# Patient Record
Sex: FEMALE | Race: WHITE | NOT HISPANIC OR LATINO | URBAN - METROPOLITAN AREA
[De-identification: names, ages, dates, MRNs, and addresses within clinical notes are randomized per-mention and may not be internally consistent; named-entity substitution may affect disease eponyms.]

---

## 2024-04-10 ENCOUNTER — E-VISIT (OUTPATIENT)
Dept: FAMILY MEDICINE CLINIC | Facility: TELEHEALTH | Age: 64
End: 2024-04-10
Payer: COMMERCIAL

## 2024-04-10 RX ORDER — METHYLPREDNISOLONE 4 MG/1
TABLET ORAL
Qty: 21 TABLET | Refills: 0 | Status: SHIPPED | OUTPATIENT
Start: 2024-04-10

## 2024-04-10 NOTE — EXTERNAL PATIENT INSTRUCTIONS
Note   I ordered you a Medrol sveta. This is a steroid. When you take steroids do not take nsaids. This inlcudes; ibuprofen, aleve and aspirin. You may take tylenol if needed for pain or fever. Hydrate well.   Diagnosis   Acute bronchitis (chest cold)   My name is RENÉ Metcalf, and I'm a healthcare provider at [facility]. Based on your responses, I see that you have a chest cold, also known as acute bronchitis.   To prevent the spread of illness to others, stay home and away from other people as much as possible while you're sick. When you need to be around other people, consider wearing a face mask.   Here are the main things to know about your current symptoms:    Acute bronchitis gets better on its own within 1 to 3 weeks.    The medications I recommend here will help ease your symptoms.   Based on what you told me in your interview, I haven't prescribed any antibiotics. Antibiotics fight bacteria, not viruses. Your bronchitis is caused by a virus, so antibiotics won't help. They could even make you feel worse as they can cause or worsen nausea, diarrhea, and stomach pain. The following factors suggest that a virus is causing your symptoms:    You've had symptoms for less than 10 days. A bacterial infection is suspected when you've had symptoms longer than 10 days without improvement.    You don't have a sore throat. A bacterial infection like strep throat causes a severe sore throat.    You haven't had a fever. Bacterial infections can cause a high fever.   Medications   Your pharmacy   [id number] [address] [phone]     Prescription   Benzonatate (200mg): Take 1 capsule by mouth 3 times a day as needed for cough. Do not chew or cut these capsules.   Albuterol sulfate HFA (90mcg/puff): Inhale 2 puffs every 6 hours as needed for wheezing. If symptoms are severe, may use as often as every 4 hours.   Non-prescription   Acetaminophen (325mg): Take 2 tablets  by mouth every 4 hours as needed for up to 10 days for any fever, pain, or discomfort associated with your condition. Do not exceed 6 doses in a 24-hour period.   Mucus Relief ER oral tablet, extended release (600mg): Take 1 tablet by mouth every 12 hours as needed for cough and congestion.   About your diagnosis   The bronchial tubes carry air to the lungs. Acute bronchitis happens when these tubes become irritated and inflamed. Smoke and air pollution can cause acute bronchitis, but it's usually caused by a virus. The viruses that cause the common cold can also cause bronchitis.   Common symptoms of acute bronchitis include coughing up mucus or phlegm, wheezing, and chest tightness. Cold symptoms along with fatigue, body aches, difficulty sleeping, or decreased appetite, are also common.   What to expect   If you follow this treatment plan, you should feel better in 1 to 3 weeks.   When to seek care   Call us at [phone]   with any sudden or unexpected symptoms.    Fever that measures over 103F or continues for more than 3 days.    A worsening headache.    Your shortness of breath becomes severe.    You need to use your albuterol inhaler more often than every 2 to 3 hours.    Your sinus pain continues for 10 days or more, without improvement.    Swallowing becomes extremely difficult or impossible.    More than 5 episodes of diarrhea in a day.    More than 5 episodes of vomiting in a day.    Coughing up red or bloody mucus.    Severe stomach pain.    Symptoms that get better for a few days, and then suddenly get worse.    Severe chest pain   Other treatment    Rest! Your body needs rest to recover and fight infection.    Drink plenty of water to stay hydrated.    If your nose or sinuses become very stuffy, try using a Neti Pot to flush them out. Neti Pots are available at any drugstore without a prescription.    Gargle with salt water several times a day to help your throat feel better. Cough drops and  throat lozenges may provide extra relief. A teaspoon of honey stirred into warm water or weak tea can help soothe a sore throat and cough.    Avoid smoke and air pollution. Smoke can make infections worse.   Prevention    Avoid close contact with other people when you're sick.    Cover your mouth and nose when you cough or sneeze. Use a tissue or cough into your elbow. Make sure that used tissues go directly into the trash.    Avoid touching your eyes, nose, or mouth while you're sick.    Wash your hands often, especially after coughing, sneezing, or blowing your nose. If soap and water are not available, use an alcohol-based hand .    If you or someone in your home or workplace is sick, disinfect commonly used items. This includes door handles, tables, computers, remotes, and pens.    Coronavirus (COVID-19) information   Common symptoms of COVID-19 include fever, cough, shortness of breath, fatigue, muscle or body aches, headaches, new loss of sense of taste or smell, sore throat, stuffy or runny nose, nausea or vomiting, and diarrhea. Most people who get COVID-19 have mild symptoms and can rest at home until they get better. Elderly people and those with chronic medical problems may be at risk for more serious complications.   FAQs about the COVID-19 vaccine   Are the vaccines safe?   Yes. Hundreds of millions of people in the [place] have already safely received COVID-19 vaccines under the most intense safety monitoring in the history of the [place].   Do I need the vaccine if I've already had COVID?   Yes. Vaccination helps protect you even if you've already had COVID.   If you had COVID-19 and had symptoms, wait to get vaccinated until you've recovered and completed your isolation period.   If you tested positive for COVID-19 but did not have symptoms, you can get vaccinated after 5 full days have passed since you had a positive test, as long as you don't develop symptoms.   How many doses of the vaccine do I  need?   Visit www.cdc.gov/coronavirus/2019-ncov/vaccines/stay-up-to-date.html   to find out how to stay up to date with your COVID-19 vaccines.   I'm immunocompromised. How many doses of the vaccine do I need?   For information on how immunocompromised people can stay up to date with their COVID-19 vaccines, visit www.cdc.gov/coronavirus/2019-ncov/vaccines/recommendations/immuno.html  .   What are the common side effects of the vaccine?   A sore arm, tiredness, headache, and muscle pain may occur within two days of getting the vaccine and last a day or two. For the Moderna or Pfizer vaccines, side effects are more common after the second dose. People over the age of 55 are less likely to have side effects than younger people.   After I'm up to date on vaccines, can I still get or spread COVID?   Yes, you can still get COVID, but your disease should be milder. And your risk of serious illness, hospitalization, and complications will be much lower, especially if you're up to date. Unfortunately, you can still spread COVID if you've been vaccinated. That's why it's important to follow isolation guidelines if you get sick or test positive.   After I'm up to date on vaccines, can I go back to normal?   You should still wear a mask indoors in public if:    It's required by laws, rules, regulations, or local guidance.    You have a weakened immune system.    Your age puts you at increased risk of severe disease.    You have a medical condition that puts you at increased risk of severe disease.    Someone in your household has a weakened immune system, is at increased risk for severe disease, or is unvaccinated.    You're in an area of high transmission.   Where can I get a COVID-19 vaccine?   Visit [facility]'s website for more information. To find a COVID-19 vaccination site near you, visit www.vaccines.gov/  , call [phone]  , or text your zip code to 867327 ([place]). Message and data rates may apply.   I've  had close contact with someone who has COVID. Do I need to quarantine, and if so, for how long?   For the most current answer, including a calculator to determine whether you need to stay home and for how long, visit www.cdc.gov/coronavirus/2019-ncov/your-health/isolation.html  .   I've tested positive for COVID. How long do I need to isolate?   For the latest recommendations, including a calculator to determine how long you need to stay home, visit www.cdc.gov/coronavirus/2019-ncov/your-health/isolation.html  .   What if I develop symptoms that might be from COVID?   For the latest recommendations on what to do if you're sick, including when to seek emergency care, visit www.cdc.gov/coronavirus/2019-ncov/if-you-are-sick/index.html  .    Flu vaccine information   Who should get a flu vaccine?   Everyone 6 months of age and older should get a yearly flu vaccine.   When should I get vaccinated?   You should get a flu vaccine by the end of October. Once you're vaccinated, it takes about two weeks for antibodies to develop and protect you against the flu. That's why it's important to get vaccinated as soon as possible.   After October, is it too late to get vaccinated?   No. You should still get vaccinated. As long as the flu viruses are still in your community, flu vaccines will remain available, even into January of next year or later.   Why do I need a flu vaccine EVERY year?   Flu viruses are constantly changing, so flu vaccines are usually updated from one season to the next. Your protection from the flu vaccine also lessens over time.   Is the flu vaccine safe?   Yes. Over the last 50 years, hundreds of millions of Americans have safely received the flu vaccines.   What are the side effects of flu vaccines?   You CANNOT get the flu from a flu vaccine. Common side effects of the flu shot include soreness, redness and/or swelling where the shot was given, low grade fever, and aches. Common side effects of the nasal  spray flu vaccine for adults include runny nose, headaches, sore throat, and cough. For children, side effects include wheezing, vomiting, muscle aches, and fever.   Does the flu vaccine increase your risk of getting COVID-19?   No. There is no evidence that getting a flu vaccine increases your risk of getting COVID-19.   Is it safe to get the flu vaccine along with a COVID-19 vaccine?   Yes. It's safe to get the flu vaccine with a COVID-19 vaccine or booster.   Contact your healthcare provider TODAY for details on when and where to get your flu vaccine.   Your provider   Your diagnosis was provided by RENÉ Metcalf, a member of your trusted care team at [facility].   If you have any questions, call us at [phone]  .

## 2024-04-10 NOTE — E-VISIT TREATED
Chief Complaint: Cold, flu, COVID, sinus, hay fever, or seasonal allergies   Patient introduction   Patient is 63-year-old female with cough, shortness of breath, and new loss of smell or taste that started 3 to 5 days ago. Regarding date of symptom onset, patient writes: 04/06/24.   COVID-19 testing history, vaccination status, and exposure:    Patient was tested for COVID-19 within the last 24 hours. Test result was negative.    Has not received an updated [id number] COVID-19 vaccination (Pfizer-BioNTech or Moderna vaccine after September 12, 2023; or Novavax vaccine after October 3, 2023).    No known exposure to a person with a confirmed or suspected case of COVID-19.    No high-risk (household) exposure to COVID-19 within the last 14 days.   Risk factors for severe disease from COVID-19 infection    Age 50 or older.    BMI >= 40.   General presentation   Symptoms came on gradually.   Fever:    No fever.   Sinus and nasal symptoms:    Sinus pain or pressure on or around the cheeks and upper teeth or jaw.    Patient first noticed sinus pain less than 5 days ago.    Sinus pain is not worse with Valsalva.    No nasal or sinus congestion.    No nasal discharge.    No itchy nose or sneezing.    No history of unhealed nasal septal ulcer/nasal wound.    No history of antibiotic treatment for sinus infection in the last year.    No history of deviated septum or nasal polyps.   Throat symptoms:    No sore throat.   Head and body aches:    No headache.    No sweats.    No chills.    No myalgia.    No fatigue.   Cough:    Cough is worse in the morning.    Cough is productive of sputum.    Describes color of sputum as yellow.   Wheezing and shortness of breath:    Wheezing.    Moderate shortness of breath that does not affect ADLs.    Despite shortness of breath, patient can speak normally.    Despite shortness of breath, patient can take a full, deep breath (inhaling for 3 to 4 seconds).    Has previously used an  "albuterol inhaler for URI, bronchitis, or pneumonia.    Using albuterol for current symptoms.    Using albuterol every 4 to 6 hours.    Patient requests a prescription of albuterol in the form of an inhaler.    No COPD diagnosis.    No asthma diagnosis.    No previous steroid inhaler use for URIs, bronchitis, or pneumonia.   Chest pain:    No chest pain.   Ear symptoms:    None.   Dizziness:    No dizziness.   Allergies:    Patient has known seasonal allergies.    Patient does not think symptoms are allergy-related.   Flu exposure:    No recent known exposure to a person with a confirmed flu diagnosis.    Received a flu vaccine in the last 3 to 6 months.   Patient is taking over-the-counter medications for current symptoms, including acetaminophen, cetirizine, guaifenesin, and ibuprofen.   Review of red flags/alarm symptoms:    No severe shortness of breath.    No inability to repeat a sentence without stopping for breath.    No inability to take a full breath lasting 3 to 4 seconds.    No changes in alertness or awareness.    No paroxysmal cough followed by whoop on inspiration    No symptoms suggesting respiratory distress.    No decreased urination.    No blue or gray coloring present in face, lips, or nail beds.    No swelling, pain, redness, or increased warmth in the calf or lower part of ONE leg only.    No proptosis.   Pregnancy/menstrual status/breastfeeding:    Patient is postmenopausal.   Self-exam:    Height: 5' 5\"    Weight: 257 lbs    Patient has a home pulse oximeter, a home blood pressure monitor, and a FitBit or smart wristband.    Regarding exact blood pressure reading and heart rate, patient writes: 126/72.    Regarding O2 saturation percentage and heart rate BPM readings on their home pulse oximeter, patient writes: 95% 68 bpm.    Neck lymph nodes feel normal.   Recent antibiotic use:    Has not taken antibiotics for similar symptoms within the past month.   Current medications   Currently taking " cetirizine 10 MG tablet.   Medication allergies    Cephalosporins    Paxlovid or one of its components    Penicillins   Medication contraindication review   No history of anaphylactic reaction to beta-lactam antibiotics; aspirin triad; blood dyscrasia; bone marrow depression; catecholamine-releasing paraganglioma; coronary artery disease; coagulation disorder; congenital long QT syndrome; depression; electrolyte abnormalities; fungal infection; GI bleeding; GI obstruction; G6PD deficiency; heart arrhythmia; hypertension; mononucleosis; myasthenia; recent myocardial infarction; NSAID-induced asthma/urticaria; Parkinson's disease; pheochromocytoma; porphyria; Reye syndrome; seizure disorder; ulcerative colitis; and urinary retention.   No history of metoclopramide-associated dystonic reaction and tardive dyskinesia.   No known history of azithromycin-associated cholestatic jaundice or hepatic impairment.   Past medical history   Immune conditions:   No immunocompromising conditions.   No history of cancer.   Social history   Never smoked tobacco.   High-risk household contacts:    Household contact 65 years or older.    Household contact with extreme obesity (BMI > 40).   Patient-submitted comments   Patient was asked if they had anything to add about their symptoms. Patient writes: Not getting any sicker, but not improving any either..   Patient did not request an excuse note.   Assessment   Acute bronchitis.   This is the likely diagnosis based on patient's interview responses, including:    Symptom profile    Gradual onset of symptoms   Plan   Medications:    benzonatate 200 mg capsule RX 200mg 1 cap PO tid PRN 7d for cough. Do not chew or cut these capsules. Amount is 21 cap.    albuterol sulfate HFA 90 mcg/actuation aerosol inhaler RX 90mcg/puff 2 puffs inhaled q6h PRN 10d for wheezing. If symptoms are severe, may use as often as every 4 hours. Amount is 1 each.    acetaminophen 325 mg tablet OTC 325mg 2 tabs PO  q4h PRN 10d for any fever, pain, or discomfort associated with your condition. Do not exceed 6 doses in a 24-hour period. Amount is 120 tab.    Mucus Relief  mg tablet, extended release OTC 600mg 1 tab PO q12h PRN 7d for cough and congestion. Amount is 14 tab.   The patient's prescriptions will be sent to:   [id number] [address]   Phone: [phone]     Fax: [phone]   Patient informed to purchase OTC medications.   Education:    Condition and causes    Prevention    Treatment and self-care    When to call provider   ----------   Electronically signed by RENÉ Metcalf on 2024-04-10 at 16:02PM   ----------   Patient Interview Transcript:   Which of these symptoms are bothering you? Select all that apply.    Cough    Shortness of breath    Loss of smell or taste   Not selected:    Stuffed-up nose or sinuses    Runny nose    Itchy or watery eyes    Itchy nose or sneezing    Sore throat    Hoarse voice or loss of voice    Headache    Fever    Sweats    Chills    Muscle or body aches    Fatigue or tiredness    Nausea or vomiting    Diarrhea    I don't have any of these symptoms   When did your current symptoms start? Select one.    3 to 5 days ago   Not selected:    Less than 48 hours ago    6 to 9 days ago    10 to 14 days ago    2 to 3 weeks ago    3 to 4 weeks ago    More than a month ago   Do you know the exact date your symptoms started? If so, enter the date as MM/DD/YY. Select one.    Yes (specify): 04/06/24   Not selected:    No   Did your symptoms come on suddenly or gradually? Select one.    Gradually   Not selected:    Suddenly    I'm not sure   Since your current symptoms started, have you been tested for COVID-19? This includes home self-tests as well as nose swab or saliva tests done at a doctor's office, lab, or testing site. Select one.    Yes   Not selected:    No   When was your most recent COVID-19 test? Select one.    Within the last 24  "hours   Not selected:    Within the last week (specify date as MM/DD/YY)    7 to 14 days ago    15 to 30 days ago    More than 1 month ago   What was the result of your most recent COVID-19 test? Select one.    Negative   Not selected:    Positive   Has anyone in your household tested positive for COVID-19 in the past 14 days? Select one.    No   Not selected:    Yes   In the last 14 days, have you had close contact with someone who has COVID-19? \"Close contact\" means any of these: - Caring for someone with COVID-19. - Being within 6 feet of someone with COVID-19 for a total of at least 15 minutes over a 24-hour period. For example, three 5-minute exposures for a total of 15 minutes. - Being in direct contact with respiratory droplets from someone with COVID-19 (being coughed on, kissing, sharing utensils). Select one.    No, not that I know of   Not selected:    Yes, a confirmed case    Yes, a suspected case   Have you gotten the [id number] updated COVID-19 vaccine? This means either the updated Pfizer-BioNTech or Moderna vaccine after September 12, 2023; or the updated Novavax vaccine after October 3, 2023. Select one.    No   Not selected:    Yes   How would you describe your shortness of breath? Sometimes shortness of breath can be a sign of a more serious condition. Select one.    Moderate (it's bad, but I can still do simple things like get dressed, bathe, or comb my hair)   Not selected:    Mild (about what I normally have with a cold)    Severe (it's so bad that I can't do simple things like get dressed, bathe, or comb my hair)   We'd like to find out more about your shortness of breath. Try to say the following sentence out loud: \"I went to the store today to buy bread, milk, and eggs.\" Can you get to the end of the sentence without stopping for breath? If not, you may need emergency care.    Yes   Not selected:    No   Are you able to take a full, deep breath? A full, deep breath means inhaling for 3 to 4 " seconds. If you can't do this, you may need to seek emergency care. Select one.    Yes   Not selected:    No   Since your symptoms started, have you felt dizzy? Select one.    No   Not selected:    Yes, but I can still do my regular daily activities    Yes, and it makes it hard to stand, walk, or do daily activities   Do you have chest pain? You might also feel it as discomfort, aching, tightness, or squeezing in the chest. Select one.    No   Not selected:    Yes   Do you have any of these devices at home? Select all that apply.    A home pulse oximeter    Home blood pressure monitor    FitBit or other smart wristband   Not selected:    Apple Watch or other smart watch    Other wearable device    No   Use your pulse oximeter now. Are either of these true? Select all that apply.    Neither of these   Not selected:    My oxygen saturation is 94% or less    My heart rate is 120 beats per minute (BPM) or higher   What was the exact reading on your pulse oximeter? Include both the oxygen saturation followed by a percent sign (%), and your heart rate in beats per minute (BPM).    95% 68 bpm   Use your blood pressure cuff now. What is your exact blood pressure reading?    126/72   Do you cough so hard that it's made you gag or vomit? By gag, we mean has your coughing made you choke or dry heave? Select all that apply.    Yes, my coughing has made me gag   Not selected:    Yes, my coughing has made me vomit    No   Does your cough come in spasms of 10 to 20 coughs in a row, followed by a loud whoop when breathing in? Select one.    No   Not selected:    Yes   When is your cough the worst? Select all that apply.    In the morning, or when I wake up   Not selected:    During the day    At nighttime, or while I'm sleeping    I haven't noticed a difference depending on time of day   Are you coughing up mucus or phlegm? Select one.    Yes, a lot   Not selected:    No, my cough is dry    Yes, a little   What color is most of the  mucus or phlegm that you're coughing up? Select one.    Yellow or yellowish   Not selected:    Clear    White/frothy    Green or greenish    Red or pink    I'm not sure   Do you feel sinus pain or pressure in any of these areas?    In my cheeks    In my upper teeth or jaw   Not selected:    In my forehead    Around my eyes    Behind my nose    No   When did you first notice your sinus pain or pressure? Select one.    Less than 5 days ago   Not selected:    5 to 9 days ago    10 to 14 days ago    2 to 4 weeks ago    1 month ago or longer   Does coughing, sneezing, or leaning forward make your sinuses feel worse? Select one.    No   Not selected:    Yes   Have you urinated at least 3 times in the last 24 hours? Select one.    Yes   Not selected:    No   Do your face, lips, or nail beds appear blue or gray? Select one.    No   Not selected:    Yes   Do you have any swelling, pain, redness, or increased warmth in the calf or lower part of ONE leg only? Select one.    No   Not selected:    Yes   Changes in alertness or awareness may mean you need emergency care. Since your symptoms started, have you had any of these? Select all that apply.    None of the above   Not selected:    Confusion    Slurred speech    Not knowing where you are or what day it is    Difficulty staying conscious    Fainting or passing out   Do your symptoms include a whistling sound, or wheezing, when you breathe? Select one.    Yes   Not selected:    No   Since your symptoms started, have you noticed that one or both of your eyes is bulging or poking out? Select one.    No   Not selected:    Yes   Do you have any of these symptoms in your ear(s)? Select all that apply.    None of the above   Not selected:    Pain    Pressure    Fullness    Crackling or popping    Plugged or blocked sensation   Are your glands/lymph nodes swollen, or does it hurt when you touch them?    No, not that I can tell   Not selected:    Yes   In the past week, has anyone  around you (such as at school, work, or home) had a confirmed diagnosis of the flu? A confirmed diagnosis means that a nose swab was done to verify a flu infection. Select all that apply.    No, not that I know of   Not selected:    I live with someone who has the flu    I've been within touching distance of someone who has the flu    I've walked by, or sat about 3 feet away from, someone who has the flu    I've been in the same building as someone who has the flu   Have you ever been diagnosed with asthma? Select one.    No   Not selected:    Yes   Have you ever been prescribed albuterol to use for wheezing, cough, or shortness of breath caused by a cold, bronchitis, or pneumonia? Albuterol (ProAir, Proventil, Ventolin) is prescribed as an inhaler or a solution to be used with a nebulizer machine. Select one.    Yes   Not selected:    No, not that I know of   Have you ever been prescribed a steroid inhaler to use for wheezing, cough, or shortness of breath caused by a cold, bronchitis, or pneumonia? Some examples of steroid inhalers include Pulmicort, Flovent, Qvar, and Alvesco. Select one.    No, not that I know of   Not selected:    Yes   Have you used albuterol for your current symptoms? This includes both albuterol inhalers and albuterol solution in a nebulizer machine. Select one.    Yes   Not selected:    No, I don't have any, or it's     No, I don't feel like I need it   How often are you using albuterol? If you're using albuterol very frequently, you'll need to be seen in person. Select one.    Every 4 to 6 hours   Not selected:    More than once an hour    Every 1 to 2 hours    Every 2 to 3 hours    Every 3 to 4 hours    Every 6 hours or longer   Do you need a refill of albuterol? Select one.    Yes   Not selected:    No   What form of albuterol do you prefer? Select one.    Inhaler   Not selected:    Nebulizer solution   Have you ever been diagnosed with chronic obstructive pulmonary disease  (COPD)? Select one.    No, not that I know of   Not selected:    Yes   In the past month, have you taken antibiotics for similar symptoms? Examples of antibiotics include amoxicillin, amoxicillin-clavulanate (Augmentin), penicillin, cefdinir (Omnicef), doxycycline, and clindamycin (Cleocin). Select one.    No   Not selected:    Yes (specify)   In the last year, how many times were you treated with antibiotics for a sinus infection? Select one.    None   Not selected:    1 to 3 times    4 or more times   Do any of these apply to you? Select all that apply.    None of the above   Not selected:    I've been hospitalized within the last 5 days    I have diabetes    I'm in close contact with a child in    Have you been diagnosed with a deviated septum or nasal polyps? The nose is divided into two nostrils by the septum. A crooked septum is called a deviated septum. Nasal polyps are growths inside the nose or sinuses. Select one.    No, not that I know of   Not selected:    Yes, but I had surgery to treat them    Yes, I have a deviated septum    Yes, I have nasal polyps    Yes, I have a deviated septum and nasal polyps   Do you have a sore inside your nose that won't heal? Select one.    No, not that I know of   Not selected:    Yes   Do you have allergies (pollen, dust mites, mold, animal dander)? Select one.    Yes   Not selected:    No, not that I know of   What kind of allergies do you have? Select all that apply.    Seasonal allergies (hay fever)   Not selected:    Perennial, or year-round, allergies (hay fever)    Pet allergies    Dust allergies    None of the above    I'm not sure   Do you think your symptoms could be allergy-related? Select one.    No   Not selected:    Yes    I'm not sure   Have you had a flu shot this season? Select one.    Yes, 3 to 6 months ago   Not selected:    Yes, less than 2 weeks ago    Yes, 2 to 4 weeks ago    Yes, 1 to 3 months ago    Yes, more than 6 months ago    No   Have you  gone through menopause? Select one.    Yes   Not selected:    No    I'm going through it now   The flu and COVID-19 can be more serious for people in certain groups. The next few questions help us figure out if you or anyone you live with is at higher risk for complications from these infections. Do any of these statements apply to you? Select all that apply.    None of the above   Not selected:    I'm     I'm     I'm Black    I'm  or    Do you smoke tobacco? Select one.    No   Not selected:    Yes, every day    Yes, some days    No, I quit   Do you have a mostly inactive lifestyle? Answer yes if all of these are true: - You spend at least 6 hours a day sitting or lying down - You get less than 2 and a half hours per week of moderate exercise such as walking fast - You get less than 1 hour and 15 minutes per week of intense exercise such as jogging or running Select one.    No   Not selected:    Yes   Do you have any of these conditions? Select all that apply.    None of the above   Not selected:    Chronic lung disease, such as cystic fibrosis or interstitial fibrosis    Heart disease, such as congenital heart disease, congestive heart failure, or coronary artery disease    Disorder of the brain, spinal cord, or nerves and muscles, such as dementia, cerebral palsy, epilepsy, muscular dystrophy, or developmental delay    Metabolic disorder or mitochondrial disease    Cerebrovascular disease, such as stroke or another condition affecting the blood vessels or blood supply to the brain    Down syndrome    Mood disorder, including depression or schizophrenia spectrum disorders    Substance use disorder, such as alcohol, opioid, or cocaine use disorder    Tuberculosis    Primary immunodeficiency   Do you live in a group care setting? Examples include: - Nursing home - Residential care - Psychiatric treatment facility - Group home - [facility] - Board and care home - Homeless  shelter - Foster care setting Select one.    No   Not selected:    Yes   Are you a healthcare worker? Select one.    No   Not selected:    Yes   People with a very high body mass index (BMI) are at higher risk for developing complications from the flu and severe illness from COVID-19. To determine your BMI, we need to know your weight and height. Please enter your weight (in pounds).    Weight   Please enter your height.    Height   Do you have any of these conditions that can affect the immune system? Scroll to see all options. Select all that apply.    None of these   Not selected:    History of bone marrow transplant    Chronic kidney disease    Chronic liver disease (including cirrhosis)    HIV/AIDS    Inflammatory bowel disease (Crohn's disease or ulcerative colitis)    Lupus    Moderate to severe plaque psoriasis    Multiple sclerosis    Rheumatoid arthritis    Sickle cell anemia    Alpha or beta thalassemia    History of kidney, liver, heart, or other solid organ transplant    History of liver, heart, or other solid organ transplant    History of spleen removal    An autoimmune disorder not listed here (specify)    A condition requiring treatment with long-term use of oral steroids (such as prednisone, prednisolone, or dexamethasone) (specify)   Have you ever been diagnosed with cancer? Select one.    No   Not selected:    Yes, I have cancer now    Yes, but I'm in remission   The flu and COVID-19 can be more serious for people in certain groups. Do any of these apply to the people who live with you? Select all that apply.    Over age 65   Not selected:    Under age 5            Black     or     Pregnant    Has given birth, had a miscarriage, had a pregnancy loss, or had an  in the last 2 weeks    None of the above   Does any member of your household have any of these medical conditions? Select all that apply.    Extreme obesity (BMI > 40)   Not selected:     Asthma    Disorders of the brain, spinal cord, or nerves and muscles, such as dementia, cerebral palsy, epilepsy, muscular dystrophy, or developmental delay    Chronic lung disease, such as COPD or cystic fibrosis    Heart disease, such as congenital heart disease, congestive heart failure, or coronary artery disease    Cerebrovascular disease, such as stroke or another condition affecting the blood vessels or blood supply to the brain    Blood disorders, such as sickle cell disease    Diabetes    Metabolic disorders such as inherited metabolic disorders or mitochondrial disease    Kidney disorders    Liver disorders    Weakened immune system due to illness or medications such as chemotherapy or steroids    Children under the age of 19 who are on long-term aspirin therapy    None of the above   Do you have any of these conditions? Scroll to see all options. Select all that apply.    None of the above   Not selected:    Aspirin triad (also known as Samter's triad or ASA triad)    Asthma or hives from taking aspirin or other NSAIDs, such as ibuprofen or naproxen    Blockage or narrowing of the blood vessels of the heart    Blood clotting disorder    Blood dyscrasia, such anemia, leukemia, lymphoma, or myeloma    Bone marrow depression    Catecholamine-releasing paraganglioma    Congenital long QT syndrome    Depression    Difficulty urinating or completely emptying your bladder    Uncorrected electrolyte abnormalities    Fungal infection    Gastrointestinal (GI) bleeding    Gastrointestinal (GI) obstruction    G6PD deficiency    Recent heart attack    High blood pressure    Irregular heartbeat or heart rhythm    Mononucleosis (mono)    Myasthenia gravis    Parkinson's disease    Pheochromocytoma    Reye syndrome    Seizure disorder    Thyroid disease    Ulcerative colitis   Have you ever had either of these conditions? Select all that apply.    No   Not selected:    Metoclopramide-associated dystonic reaction     Tardive dyskinesia   Just a few more questions about medications, and then you're finished. Have you used any non-prescription medications or nasal sprays for your current symptoms? Examples include saline sprays, decongestants, NyQuil, and Tylenol. Select one.    Yes   Not selected:    No   Which of these non-prescription medications have you tried? Scroll to see all options. Select all that apply.    Acetaminophen (Tylenol)    Cetirizine (Zyrtec)    Guaifenesin (Mucinex)    Ibuprofen (Advil, Motrin, Midol)   Not selected:    Budesonide (Rhinocort)    Chlorpheniramine (Aller-chlor, Chlor-Trimeton)    Cromolyn (NasalCrom)    Dextromethorphan (Delsym, Robitussin, Vicks DayQuil Cough)    Diphenhydramine (Benadryl)    Fexofenadine (Allegra)    Fluticasone (Flonase)    Guaifenesin/dextromethorphan (Delsym DM, Mucinex DM, Robitussin DM)    Ketotifen (Alaway, Zaditor)    Loratadine (Alavert, Claritin)    Naphazoline-pheniramine (Naphcon-A, Opcon-A, Visine-A)    Omeprazole (Prilosec)    Oxymetazoline (Afrin)    Phenylephrine (Sudafed PE)    Triamcinolone (Nasacort)    None of the above   Have you taken any monoamine oxidase inhibitor (MAOI) medications in the last 14 days? Examples include rasagiline (Azilect), selegiline (Eldepryl, Zelapar), isocarboxazid (Marplan), phenelzine (Nardil), and tranylcypromine (Parnate). Select one.    No, not that I know of   Not selected:    Yes   Do you take Kynmobi or Apokyn (apomorphine)? Select one.    No   Not selected:    Yes   Are you still taking these medications listed in your medical record? If you're not taking any of these, click Next. Select all that apply.    cetirizine 10 MG tablet   Are you taking any other medications, vitamins, or supplements? Select one.    No   Not selected:    Yes   Have you ever had an allergic or bad reaction to any medication? Select one.    Yes   Not selected:    No   Have you had an allergic or bad reaction to any of these medications? Select all  "that apply.    Paxlovid, nirmatrelvir, or ritonavir (Norvir)   Not selected:    Baloxavir (Xofluza)    Benzonatate (Tessalon Perles)    Fluconazole, itraconazole, or terconazole (brands include Diflucan, Sporanox, Terazol)    Oseltamivir (Tamiflu) or zanamivir (Relenza)    No, not that I know of   Have you had an allergic or bad reaction to any of these antibiotic medications? Select all that apply.    Penicillin or any \"-cillin\" antibiotic, such as amoxicillin, ampicillin, dicloxacillin, nafcillin, or piperacillin (Brands include Augmentin, Unasyn, and Zosyn)    Cephalexin or any \"cef-\" antibiotic, such as cefazolin, cefdinir, cefuroxime, ceftriaxone, ceftazidime, or cefepime (Brands include Ancef, Ceftin, Fortaz, Keflex, Maxipime, Rocephin, and Simplicef)   Not selected:    Tetracycline or any \"-cycline\" antibiotic, such as doxycycline, demeclocycline, minocycline (Brands include Declomycin, Doryx, Dynacin, Oracea, Monodox, Panmycin, and Vibramycin)    Ciprofloxacin or any \"-floxacin\" antibiotic, such as gemifloxacin, levofloxacin, moxifloxacin, or ofloxacin (Brands include Factive, Cipro, Floxin, and Levaquin)    Azithromycin or any \"-thromycin\" antibiotic, such as erythromycin or clarithromycin (Brands include Biaxin, Erythrocin, Z-sveta, and Zithromax)    Clindamycin or lincomycin (Brands include Cleocin and Lincocin)    No, not that I know of   Have you had an allergic or bad reaction to any of these medications? Select all that apply.    No, not that I know of   Not selected:    Albuterol or a similar medication    Atropine    Corticosteroid (steroid) medication, including topical steroids, inhaled steroids, nasal steroids, or oral steroids (budesonide, ciclesonide, dexamethasone, flunisolide, fluticasone, methylprednisolone, triamcinolone, prednisone (or brand names Alvesco, Deltasone, Flovent, Medrol, Nasacort, Rhinocort, or Veramyst)    Metoclopramide (Reglan)    Ondansetron (Zuplenz, Zofran ODT, Zofran)   "  Prochlorperazine (Compazine)   Have you had an allergic or bad reaction to any of these eye drops, nasal sprays, or inhalers? Scroll to see all options. Select all that apply.    No, not that I know of   Not selected:    Azelastine (Astelin, Astepro, Optivar)    Cromolyn (Crolom, NasalCrom)    Ipratropium (Atrovent)    Ketotifen (Alaway, Zaditor)    Pheniramine/naphazoline (Naphcon-A, Opcon-A, Visine-A)    Olopatadine (Pataday, Patanol, Pazeo)   Have you had an allergic or bad reaction to any of these non-prescription medications? Scroll to see all options. Select all that apply.    No, not that I know of   Not selected:    Acetaminophen (Tylenol)    Aspirin    Cetirizine (Zyrtec)    Dextromethorphan (Delsym, Robitussin, Vicks DayQuil Cough)    Diphenhydramine (Benadryl)    Fexofenadine (Allegra)    Guaifenesin (Mucinex)    Dextromethorphan (Delsym)    Ibuprofen (Advil, Motrin, Midol)    Loratadine (Alavert, Claritin)    Oxymetazoline (Afrin)    Phenylephrine (Sudafed PE)    Pseudoephedrine (Sudafed)   Are you allergic to milk or to the proteins found in milk (for example, whey or casein)? A milk allergy is different from lactose intolerance. Select one.    No, not that I know of   Not selected:    Yes   Have you ever had jaundice or liver problems as a result of taking azithromycin (Zithromax, Zmax)? Jaundice is a condition in which the skin and the whites of the eyes turn yellow. Select all that apply.    No, not that I know of   Not selected:    Yes, jaundice    Yes, liver problems   Do you need a doctor's note? A doctor's note confirms that you received care today and states when you can return to school or work. It does not contain information about your diagnosis or treatment plan. Your provider will make the final decision on whether to give you a doctor's note and for how long. Doctor's notes CANNOT be backdated. We can't provide medical leave paperwork through this type of visit. If more paperwork is  needed to request time off, contact your primary care provider. Select one.    No   Not selected:    Today only (1 day)    Today and tomorrow (2 days)    3 days    5 days    7 days   Is there anything you'd like to add about your symptoms? Please limit your comments to the symptoms covered in this interview. If you include comments about other concerns, your provider may recommend that you be seen in person.    Not getting any sicker, but not improving any either.   ----------   Medical history   The following information was received from the EMR on April 10, 2024.   Allergies:    PENICILLINS   - Allergy Type: Medication   - Reaction: Rash   - Severity: Low   - Clinical Status: Active   - Verification Status: Confirmed    CEPHALOSPORINS   - Allergy Type: Medication   - Reaction: Hives   - Severity: None   - Clinical Status: Active   - Verification Status: Confirmed   Medications:    cetirizine (zyrTEC) tablet   - Route: Oral   - Start Date: October 01, 2023   - End Date: None   - Status: Active

## 2024-06-24 ENCOUNTER — E-VISIT (OUTPATIENT)
Dept: FAMILY MEDICINE CLINIC | Facility: TELEHEALTH | Age: 64
End: 2024-06-24
Payer: COMMERCIAL

## 2024-06-24 PROCEDURE — BRIGHTMDVISIT: Performed by: NURSE PRACTITIONER

## 2024-06-24 NOTE — EXTERNAL PATIENT INSTRUCTIONS
View Doctor's Note     Note   Betzaida, this sounds like a viral illness. I would advise to re-check for Covid and rest /increase fluids. I have prescribed some medication i hope will help. Follow up with PCP if no improvment in 7 days. Kavitha Talavera RENÉ   Diagnosis   Common cold   My name is Kavitha RmRENÉ tracy, and I'm a healthcare provider at [facility]. I reviewed your interview, and I see that you may have a cold.   About your diagnosis   The common cold is a viral infection of the respiratory tract, which includes the nose, throat, breathing passages, and lungs. These are the key things to know about colds:    Common symptoms include low-grade fever, sneezing, runny nose, congestion, sore throat, and cough. You may also notice fatigue, body aches, difficulty sleeping, or decreased appetite.    There is no vaccine to prevent colds and no cure for a cold.    You can spread a cold to other people.    Over 200 different viruses can cause the common cold. That's why you can get another cold after you've just had one.    You can use the medications recommended here to help ease your symptoms.   Based on what you told me in your interview, I haven't prescribed antibiotics. Antibiotics fight bacteria, not viruses. They don't help when you have a viral infection like the common cold. Antibiotics could even make you feel worse as they can cause or worsen nausea, diarrhea, and stomach pain. The following factors suggest that a virus is causing your symptoms:    You've had sinus pressure and other symptoms for less than 5 days, and you don't have a high fever. Bacterial infections tend to come on suddenly and severely and last a long time without improvement.    Your fever is mild. Bacterial infections can cause a high fever.    Your glands/lymph nodes are swollen or tender to the touch. Swollen lymph nodes are common with viral conditions like yours.    In addition to your sore throat, you have other cold symptoms  like a stuffy nose or cough. This suggests a viral infection, rather than a bacterial infection such as strep throat.   What to expect   You should feel better within 5 to 7 days and be back to normal within 14 days.   I've given you a doctor's note for 2 days.   Medications   Your pharmacy   [id number] [id number] [phone]     Prescription   Benzonatate (200mg): Take 1 capsule by mouth 3 times a day as needed for cough. Do not chew or cut these capsules.   Albuterol sulfate HFA (90mcg/puff): Inhale 2 puffs every 6 hours as needed for wheezing. If symptoms are severe, may use as often as every 4 hours.   Non-prescription   Ibuprofen (200mg): Take 2 tablets by mouth every 8 hours as needed for up to 10 days for any fever, pain, or discomfort associated with your condition. Do not exceed 3200mg in a 24-hour period.   Pseudoephedrine ER (120mg): Take 1 tablet by mouth every 12 hours as needed for nasal congestion for up to 5 days. Do not exceed 240mg (2 pills) in a 24-hour period.   Mucus Relief ER oral tablet, extended release (600mg): Take 1 tablet by mouth every 12 hours as needed for cough and congestion.    Although you don't have a cough, I've included cough medication in your treatment plan. If you develop a cough, this medication will help.    I've recommended a decongestant (pseudoephedrine) for your congestion. Although this is an over-the-counter medication, it's kept behind the pharmacist's counter. Ask the pharmacist for pseudoephedrine for your congestion.   Other treatment    Rest! Your body needs rest to recover and fight the virus.    Drink plenty of water to stay hydrated.    Use a clean humidifier or a cool-mist vaporizer in your room at night. Breathing humid air may help with nasal congestion and cough.    Try non-prescription saline nasal sprays to help your nasal symptoms.    Try using a Neti Pot to flush out your stuffy nose and sinuses. Neti Pots  are available at any drugstore without a prescription.    Gargle with salt water several times a day to help your throat feel better. Cough drops and throat lozenges may provide extra relief. A teaspoon of honey stirred into warm water or weak tea can help soothe a sore throat and cough.    Avoid smoke and air pollution. Smoke can make infections worse.   When to seek care   Call us at [phone]   with any sudden or unexpected symptoms.    Fever that measures over 103F or continues for more than 3 days.    Your headache worsens.    Your throat pain worsens and makes swallowing extremely difficult or impossible.    Your hoarse voice or loss of voice lasts longer than 2 weeks.    Your sinus pain continues for 10 days or more, without improvement.    More than 5 episodes of diarrhea in a day.    More than 5 episodes of vomiting in a day.    Coughing up red or bloody mucus.    Severe shortness of breath.    Severe stomach pain.    Symptoms that get better for a few days, and then suddenly get worse.    Severe chest pain   Preventing the spread of respiratory viruses   COVID-19, the flu, and other respiratory illnesses can have similar symptoms. These include fever, cough, shortness of breath, fatigue, muscle or body aches, headaches, new loss of sense of taste or smell, sore throat, stuffy or runny nose, nausea or vomiting, and diarrhea. Most people with a respiratory infection have mild symptoms and can rest at home until they get better. Elderly people and those with chronic medical problems may be at risk for more serious complications. It's important to take steps to prevent the spread of respiratory illness.   When you have symptoms of a respiratory virus:   Stay home and away from others, including people you live with who aren't sick. This is called isolation. You can stop isolation and go back to normal activities when both of these are true:    You've been feeling better overall for 24 hours    You've  had no fever for 24 hours, and you're not using fever-reducing medication like Tylenol or ibuprofen   Then, for the next 5 days:    Open windows at home when possible, and use an air purifier    Consider wearing a face mask when around other people indoors    Take a COVID-19 test before being around other people indoors    Keep a physical distance from people   If you start to feel worse or get a fever again, you need to start isolation over again.   If you've had close contact with someone who has COVID, but you don't have symptoms:   You don't need to quarantine. You should take a COVID-19 test at least 5 days after the last close contact.   If you test positive for COVID but don't have symptoms:   You might be contagious. For the next 5 days:    Open windows at home when possible, and use an air purifier    Consider wearing a face mask when around other people indoors    Take a COVID-19 test before being around other people indoors    Keep a physical distance from people   Other tips to prevent the spread of respiratory viruses    Cover your mouth and nose with a tissue when you cough or sneeze. Throw used tissues in a lined trash can right away, and wash your hands immediately after.    Wash your hands often with soap and water for at least 20 seconds. If soap and water aren't available, clean your hands with a hand  that contains at least 60% alcohol. Cover all surfaces of your hands and rub them together until they feel dry.    Avoid touching your face, especially your eyes, nose, and mouth.    Clean high-touch surfaces daily. High-touch surfaces include counters, tabletops, doorknobs, bathroom fixtures, toilets, phones, keyboards, tablets, and bedside tables. You can use soap, detergents, 60%-80% ethanol or isopropyl alcohol,  such as Windex, or bleach. All of these  are effective at killing the virus that causes COVID-19.   COVID-19 vaccine information   COVID-19 vaccines are  safe, effective, and free. Everyone 6 months or older can get an updated COVID-19 vaccine. Visit www.cdc.gov/coronavirus/2019-ncov/vaccines/stay-up-to-date.html   to find out how to stay up to date with your COVID-19 vaccines. Immunocompromised people can visit www.cdc.gov/coronavirus/2019-ncov/vaccines/recommendations/immuno.html  .   Side effects such as a sore arm, tiredness, headache, and muscle pain may occur within two days of getting the vaccine and last a day or two. For the Moderna or Pfizer vaccines, side effects are more common after the second dose. People over the age of 55 are less likely to have side effects than younger people.   People who've had COVID-19 should still get vaccinated to protect themselves. It's possible to be infected by the COVID-19 virus more than once.   People who've recently had COVID-19 should wait to get vaccinated until they've recovered and completed their isolation period.   To find a COVID-19 vaccination site near you, visit www.[employer].gov/  , call 1-520.876.9963  , or text your zip code to 834762 ([employer]). Message and data rates may apply.   Flu vaccine information   Everyone 6 months of age and older should get a yearly flu vaccine. Children younger than 6 months old can't get the flu vaccine. This means infants are at high risk of serious complications from the flu. It's especially important for those who are around infants to get the flu vaccine.   It's best to get a flu shot by the end of October. Once you're vaccinated, it takes about two weeks for antibodies to develop and protect you against the flu. That's why it's important to get vaccinated as soon as possible.   Even if you don't get a flu shot by the end of October, you should still get one. As long as the flu viruses are still in your community, flu vaccines will remain available, even into January of the following year or later.   You need to get a flu shot every year. Flu viruses are constantly changing,  Xanax 0.5mg PO q 12 hr PRN for anxiety ordered  - start Dilaudid 0.25mg IV q 4 hrs PRN for dyspnea   - de-escalation of oxygen for hospice placement if possible. so flu vaccines are usually updated from one season to the next. Your protection from the flu vaccine also lessens over time.   Common side effects of the flu shot include soreness, redness and/or swelling where the shot was given, low grade fever, and aches. Common side effects of the nasal spray flu vaccine for adults include runny nose, headaches, sore throat, and cough. For children, side effects include wheezing, vomiting, muscle aches, and fever.   The flu vaccine is safe and effective. You can't get the flu from a flu vaccine.   It's safe to get the flu vaccine at the same time as a COVID-19 vaccine.   Contact your healthcare provider today for details on when and where to get your flu vaccine.   Your provider   Your diagnosis was provided by RENÉ Gonzalez, a member of your trusted care team at [facility].   If you have any questions, call us at [phone]  .   View Doctor's Note     Expires on 07/24/24     recs for comfort care

## 2024-06-24 NOTE — E-VISIT TREATED
Chief Complaint: Cold, flu, COVID, sinus, hay fever, or seasonal allergies   Patient introduction   Patient is 64-year-old female with congestion, nasal discharge, itchy nose or sneezing, new loss of smell or taste, sore throat, voice hoarseness, headache, fever (which may have resolved; see below), and chills that started less than 48 hours ago.   COVID-19 testing history, vaccination status, and exposure:    Patient was tested for COVID-19 within the last 24 hours. Test result was negative.    Vaccinated with the updated [id number] COVID-19 vaccine (Pfizer-BioNTech or Moderna vaccine after September 12, 2023; or Novavax vaccine after October 3, 2023). Received their most recent dose of the vaccine more than 14 days ago.    No known exposure to a person with a confirmed or suspected case of COVID-19.    No high-risk (household) exposure to COVID-19 within the last 14 days.   Risk factors for severe disease from COVID-19 infection    Age 50 or older.    BMI >= 40.   Warning. The following may warrant further investigation:    Severe unilateral throat pain with associated fever.   General presentation   Symptoms came on suddenly.   Fever:    Current fever for 1 to 3 days.    Highest temperature of 100.4F to 101.5F.   Sinus and nasal symptoms:    Nasal discharge.    Itchy nose or sneezing.    Green nasal drainage.    Nasal drainage is thick.    Postnasal drip.    1 to 3 episodes of antibiotic treatment for sinus infection in the last year.    Sinus pain or pressure on or around the forehead, eyes, nose, and cheeks.    Patient first noticed sinus pain less than 5 days ago.    Sinus pain is worse with Valsalva.    No history of unhealed nasal septal ulcer/nasal wound.    No history of deviated septum or nasal polyps.   Throat symptoms:    Sore throat. Unilateral pain described as severe on the right side and minimal to absent on the left side. Has redness in the back of the throat and on the tonsils.    Has discomfort  "when swallowing but can swallow liquids and solid foods.    Voice is mildly hoarse. Patient does not believe hoarseness is due to voice strain.    No muffled voice.   Head and body aches:    Headache described as moderate (4 to 6 on a scale of 1 to 10).    Chills.    No sweats.    No myalgia.    No fatigue.   Cough:    No cough.   Wheezing and shortness of breath:    No COPD diagnosis.    No asthma diagnosis.    No wheezing.    No shortness of breath.   Chest pain:    No chest pain.   Ear symptoms:    Current symptoms include pressure and crackling or popping in the ear(s).   Dizziness:    No dizziness.   Allergies:    Patient has known seasonal allergies.    Patient does not think symptoms are allergy-related.   Flu exposure:    No recent known exposure to a person with a confirmed flu diagnosis.    Received a flu vaccine more than 6 months ago.   Patient is taking over-the-counter medications for current symptoms, including acetaminophen, cetirizine, guaifenesin, and ibuprofen.   Review of red flags/alarm symptoms:    No changes in alertness or awareness.    No nuchal rigidity.    No symptoms suggesting airway obstruction.    No muffled voice.    No symptoms suggesting intracranial hemorrhage.    No fever above 100.4F lasting longer than 4 days.    No decreased urination.    No blue or gray coloring present in face, lips, or nail beds.    No swelling, pain, redness, or increased warmth in the calf or lower part of ONE leg only.    No proptosis.   Risk factors for antibiotic resistance:    Close contact with a child in .   Pregnancy/menstrual status/breastfeeding:    Patient is postmenopausal.   Self-exam:    Height: 5' 4\"    Weight: 258 lbs    No difficulty moving their chin toward their chest.    No tonsillar edema.    Purulent tonsils.    No palatal petechiae.    Able to open mouth normally.    Swollen/painful neck lymph nodes.   Recent antibiotic use:    Has not taken antibiotics for similar symptoms " within the past month.   Current medications   Currently taking cetirizine 10 MG tablet and Emergen-c.   Medication allergies    Cephalosporins    Paxlovid or one of its components    Penicillins   Medication contraindication review   No history of anaphylactic reaction to beta-lactam antibiotics; aspirin triad; blood dyscrasia; bone marrow depression; catecholamine-releasing paraganglioma; coronary artery disease; coagulation disorder; congenital long QT syndrome; depression; electrolyte abnormalities; fungal infection; GI bleeding; GI obstruction; G6PD deficiency; heart arrhythmia; hypertension; mononucleosis; myasthenia; recent myocardial infarction; NSAID-induced asthma/urticaria; Parkinson's disease; pheochromocytoma; porphyria; Reye syndrome; seizure disorder; ulcerative colitis; and urinary retention.   No history of metoclopramide-associated dystonic reaction and tardive dyskinesia.   No known history of azithromycin-associated cholestatic jaundice or hepatic impairment.   Past medical history   Immune conditions:   No immunocompromising conditions.   No history of cancer.   Social history   Never smoked tobacco.   High-risk household contacts:    Household contact with extreme obesity (BMI > 40).   Patient-submitted comments   Patient was asked if they had anything to add about their symptoms. Patient writes: I just started working in a  on 6/19. 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   Patient requests a 2-day excuse note.   Assessment   Acute nasopharyngitis (common cold).   This is the likely diagnosis based on patient's interview responses, including:    Symptom profile   Plan   Medications:    benzonatate 200 mg capsule RX 200mg 1 cap PO tid PRN 7d for cough. Do not chew or cut these capsules. Amount is 21 cap.    albuterol sulfate HFA 90 mcg/actuation aerosol inhaler RX 90mcg/puff 2 puffs inhaled q6h PRN 10d for wheezing. If symptoms are severe, may use as often  as every 4 hours. Amount is 1 each.    ibuprofen 200 mg tablet OTC 200mg 2 tabs PO q8h PRN 10d for any fever, pain, or discomfort associated with your condition. Do not exceed 3200mg in a 24-hour period. Amount is 60 tab.    pseudoephedrine  mg tablet,extended release OTC 120mg 1 tab PO q12h PRN 5d for nasal congestion. Do not exceed 240mg (2 pills) in a 24-hour period. Amount is 10 tab.    Mucus Relief  mg tablet, extended release OTC 600mg 1 tab PO q12h PRN 7d for cough and congestion. Amount is 14 tab.   The patient's prescriptions will be sent to:   [id number] [place] [address]   Phone: [phone]     Fax: [phone]   Patient informed to purchase OTC medications.   Other:   Patient was given an excuse note for 2 days.   Education:    Condition and causes    Prevention    Treatment and self-care    When to call provider   ----------   Electronically signed by RENÉ Gonzalez on 2024-06-24 at 11:29AM   ----------   Patient Interview Transcript:   Which of these symptoms are bothering you? Select all that apply.    Stuffed-up nose or sinuses    Runny nose    Itchy nose or sneezing    Loss of smell or taste    Sore throat    Hoarse voice or loss of voice    Headache    Fever    Chills   Not selected:    Cough    Shortness of breath    Itchy or watery eyes    Sweats    Muscle or body aches    Fatigue or tiredness    Nausea or vomiting    Diarrhea    I don't have any of these symptoms   When did your current symptoms start? Select one.    Less than 48 hours ago   Not selected:    3 to 5 days ago    6 to 9 days ago    10 to 14 days ago    2 to 3 weeks ago    3 to 4 weeks ago    More than a month ago   Did your symptoms come on suddenly or gradually? Select one.    Suddenly   Not selected:    Gradually    I'm not sure   Since your current symptoms started, have you been tested for COVID-19? This includes home self-tests as well as nose swab or saliva tests  "done at a doctor's office, lab, or testing site. Select one.    Yes   Not selected:    No   When was your most recent COVID-19 test? Select one.    Within the last 24 hours   Not selected:    Within the last week (specify date as MM/DD/YY)    7 to 14 days ago    15 to 30 days ago    More than 1 month ago   What was the result of your most recent COVID-19 test? Select one.    Negative   Not selected:    Positive   Has anyone in your household tested positive for COVID-19 in the past 14 days? Select one.    No   Not selected:    Yes   In the last 14 days, have you had close contact with someone who has COVID-19? \"Close contact\" means any of these: - Caring for someone with COVID-19. - Being within 6 feet of someone with COVID-19 for a total of at least 15 minutes over a 24-hour period. For example, three 5-minute exposures for a total of 15 minutes. - Being in direct contact with respiratory droplets from someone with COVID-19 (being coughed on, kissing, sharing utensils). Select one.    No, not that I know of   Not selected:    Yes, a confirmed case    Yes, a suspected case   Have you gotten the [id number] updated COVID-19 vaccine? This means either the updated [employer] or Moderna vaccine after September 12, 2023; or the updated Novavax vaccine after October 3, 2023. Select one.    Yes   Not selected:    No   When did you get the updated COVID-19 vaccine? Select one.    More than 14 days ago   Not selected:    Less than 48 hours (2 days) ago    48 to 72 hours (3 days) ago    3 to 5 days ago    5 to 7 days ago    7 to 14 days ago   Since your symptoms started, have you felt dizzy? Select one.    No   Not selected:    Yes, but I can still do my regular daily activities    Yes, and it makes it hard to stand, walk, or do daily activities   Do you have chest pain? You might also feel it as discomfort, aching, tightness, or squeezing in the chest. Select one.    No   Not selected:    Yes   You mentioned having a " "fever. Do you have a fever now? Select one.    Yes, but I didn't have one when my symptoms started   Not selected:    Yes, and I've had one since my symptoms started    No, it's gone now   Did you take your temperature with a thermometer? Select one.    Yes   Not selected:    No, but it felt mild    No, but it felt high   What was the highest reading on the thermometer? Select one.    100.4 to 101.5F   Not selected:    Below 100.4F    101.6 to 101.9F    102.0 to 103.0F    Above 103.0F   How long have you had a fever? Select one.    1 to 3 days   Not selected:    Less than 24 hours    4 or more days   You mentioned having a headache. On a scale of 1 to 10, how severe is your headache pain? Select one.    Moderate (4 to 6)   Not selected:    Mild (1 to 3)    Severe (7 to 9)    Unbearable (10)    The worst headache of my life (10+)   Do you feel sinus pain or pressure in any of these areas?    In my forehead    Around my eyes    Behind my nose    In my cheeks   Not selected:    In my upper teeth or jaw    No   When did you first notice your sinus pain or pressure? Select one.    Less than 5 days ago   Not selected:    5 to 9 days ago    10 to 14 days ago    2 to 4 weeks ago    1 month ago or longer   Does coughing, sneezing, or leaning forward make your sinuses feel worse? Select one.    Yes   Not selected:    No   What color is your nasal drainage? Select one.    Green or greenish   Not selected:    Clear    White    Yellow or yellowish    My nose is stuffed but not draining or running   Is your nasal drainage thick or thin? Select one.    Thick   Not selected:    Thin   Is there any drainage (mucus) going down the back of your throat? This kind of drainage is also called \"postnasal drip.\" It can cause frequent throat clearing. Select one.    Yes   Not selected:    No, not that I know of   Can you swallow liquids and solid foods? A sore throat may be painful when swallowing, but it shouldn't prevent you from " "swallowing. Select one.    Yes, but it's uncomfortable   Not selected:    Yes, with ease    Yes, but it's painful    It's hard to swallow anything because it feels like liquids and food get stuck in my throat    No, I can't swallow anything, liquid or solid foods   Is your throat pain worse on one side than the other? Select one.    Yes, it's worse on the right side   Not selected:    Yes, it's worse on the left side    No, it's the same on both sides   Which of these best describes your throat pain? Select one.    Severe pain on the right, and little to no pain on the left   Not selected:    Pain on both sides, but worse on the right   To recommend the best treatment, we need to see photos of your throat. You can have someone else take the photo, or use a mirror. If you choose not to send photos, you can still continue this interview, but your treatment options may be affected. Select one.    I'll take the photos myself   Not selected:    Someone can take the photos for me    I'd rather not send photos   Send at least 2 photos for review. - Switch the flash to On (not auto). - Stand close to a mirror. - Don't use the \"selfie\" camera. Instead, turn the phone around and tilt it slightly up. - Looking in the mirror, tap to focus on the back of the throat, not the teeth. - Say \"Ah\" so the back of your throat is visible, and take the photo. - Before sending, make sure the photos are clear and the throat is in focus.    Upload 1    Upload 2   Not selected:    Upload 3   Have you urinated at least 3 times in the last 24 hours? Select one.    Yes   Not selected:    No   Do your face, lips, or nail beds appear blue or gray? Select one.    No   Not selected:    Yes   Do you have any swelling, pain, redness, or increased warmth in the calf or lower part of ONE leg only? Select one.    No   Not selected:    Yes   Changes in alertness or awareness may mean you need emergency care. Since your symptoms started, have you had any " "of these? Select all that apply.    None of the above   Not selected:    Confusion    Slurred speech    Not knowing where you are or what day it is    Difficulty staying conscious    Fainting or passing out   Do your symptoms include a whistling sound, or wheezing, when you breathe? Select one.    No   Not selected:    Yes   Early in this interview, you told us you were hoarse or you'd lost your voice. How would you describe the changes to your voice? Select one.    It just sounds a little raspy   Not selected:    It's harder than usual to talk    I can barely talk at all   Is it possible that you strained your voice? Singing, yelling, or talking more or louder than usual can cause voice strain. Select one.    No, not that I know of   Not selected:    Yes   Since your symptoms started, have you noticed that one or both of your eyes is bulging or poking out? Select one.    No   Not selected:    Yes   Do you have any of these symptoms in your ear(s)? Select all that apply.    Pressure    Crackling or popping   Not selected:    Pain    Fullness    Plugged or blocked sensation    None of the above   Can you move your chin toward your chest?    Yes   Not selected:    No, my neck is too stiff   Try opening your mouth as wide as you can. Are you able to open your mouth all the way as you normally would? Select one.    Yes   Not selected:    No   Does your voice sound muffled? \"Muffled\" here does not mean hoarse or scratchy. By \"muffled,\" we mean that it sounds like you're speaking with a mouth full of hot food. Select one.    No, not that I can tell   Not selected:    Yes   Are your tonsils larger than usual?    No, not that I can tell   Not selected:    Yes    I've had my tonsils removed   Is there redness in the back of your throat or on your tonsils? Select all that apply.    Yes, in the back of the throat    Yes, on the tonsils   Not selected:    No, not that I can see   Is there any white or yellow pus on your " tonsils?    Yes   Not selected:    No, not that I can see   Are there red spots on the roof of your mouth or the back of your throat?    No, not that I can see   Not selected:    Yes   Are your glands/lymph nodes swollen, or does it hurt when you touch them?    Yes   Not selected:    No, not that I can tell   In the past week, has anyone around you (such as at school, work, or home) had a confirmed diagnosis of the flu? A confirmed diagnosis means that a nose swab was done to verify a flu infection. Select all that apply.    No, not that I know of   Not selected:    I live with someone who has the flu    I've been within touching distance of someone who has the flu    I've walked by, or sat about 3 feet away from, someone who has the flu    I've been in the same building as someone who has the flu   Have you ever been diagnosed with asthma? Select one.    No   Not selected:    Yes   Have you ever been diagnosed with chronic obstructive pulmonary disease (COPD)? Select one.    No, not that I know of   Not selected:    Yes   In the past month, have you taken antibiotics for similar symptoms? Examples of antibiotics include amoxicillin, amoxicillin-clavulanate (Augmentin), penicillin, cefdinir (Omnicef), doxycycline, and clindamycin (Cleocin). Select one.    No   Not selected:    Yes (specify)   In the last year, how many times were you treated with antibiotics for a sinus infection? Select one.    1 to 3 times   Not selected:    None    4 or more times   Do any of these apply to you? Select all that apply.    I'm in close contact with a child in    Not selected:    I've been hospitalized within the last 5 days    I have diabetes    None of the above   Have you been diagnosed with a deviated septum or nasal polyps? The nose is divided into two nostrils by the septum. A crooked septum is called a deviated septum. Nasal polyps are growths inside the nose or sinuses. Select one.    No, not that I know of   Not  selected:    Yes, but I had surgery to treat them    Yes, I have a deviated septum    Yes, I have nasal polyps    Yes, I have a deviated septum and nasal polyps   Do you have a sore inside your nose that won't heal? Select one.    No, not that I know of   Not selected:    Yes   Do you have allergies (pollen, dust mites, mold, animal dander)? Select one.    Yes   Not selected:    No, not that I know of   What kind of allergies do you have? Select all that apply.    Seasonal allergies (hay fever)   Not selected:    Perennial, or year-round, allergies (hay fever)    Pet allergies    Dust allergies    None of the above    I'm not sure   Do you think your symptoms could be allergy-related? Select one.    No   Not selected:    Yes    I'm not sure   Have you had a flu shot this season? Select one.    Yes, more than 6 months ago   Not selected:    Yes, less than 2 weeks ago    Yes, 2 to 4 weeks ago    Yes, 1 to 3 months ago    Yes, 3 to 6 months ago    No   Have you gone through menopause? Select one.    Yes   Not selected:    No    I'm going through it now   The flu and COVID-19 can be more serious for people in certain groups. The next few questions help us figure out if you or anyone you live with is at higher risk for complications from these infections. Do any of these statements apply to you? Select all that apply.    None of the above   Not selected:    I'm     I'm     I'm Black    I'm  or    Do you smoke tobacco? Select one.    No   Not selected:    Yes, every day    Yes, some days    No, I quit   Do you have a mostly inactive lifestyle? Answer yes if all of these are true: - You spend at least 6 hours a day sitting or lying down - You get less than 2 and a half hours per week of moderate exercise such as walking fast - You get less than 1 hour and 15 minutes per week of intense exercise such as jogging or running Select one.    No   Not selected:    Yes   Do you have any of  these conditions? Select all that apply.    None of the above   Not selected:    Chronic lung disease, such as cystic fibrosis or interstitial fibrosis    Heart disease, such as congenital heart disease, congestive heart failure, or coronary artery disease    Disorder of the brain, spinal cord, or nerves and muscles, such as dementia, cerebral palsy, epilepsy, muscular dystrophy, or developmental delay    Metabolic disorder or mitochondrial disease    Cerebrovascular disease, such as stroke or another condition affecting the blood vessels or blood supply to the brain    Down syndrome    Mood disorder, including depression or schizophrenia spectrum disorders    Substance use disorder, such as alcohol, opioid, or cocaine use disorder    Tuberculosis    Primary immunodeficiency   Do you live in a group care setting? Examples include: - Nursing home - Residential care - Psychiatric treatment facility - Group home - [facility] - [facility] and care home - Homeless shelter - Foster care setting Select one.    No   Not selected:    Yes   Are you a healthcare worker? Select one.    No   Not selected:    Yes   People with a very high body mass index (BMI) are at higher risk for developing complications from the flu and severe illness from COVID-19. To determine your BMI, we need to know your weight and height. Please enter your weight (in pounds).    Weight   Please enter your height.    Height   Do you have any of these conditions that can affect the immune system? Scroll to see all options. Select all that apply.    None of these   Not selected:    History of bone marrow transplant    Chronic kidney disease    Chronic liver disease (including cirrhosis)    HIV/AIDS    Inflammatory bowel disease (Crohn's disease or ulcerative colitis)    Lupus    Moderate to severe plaque psoriasis    Multiple sclerosis    Rheumatoid arthritis    Sickle cell anemia    Alpha or beta thalassemia    History of kidney, liver, heart, or other solid  organ transplant    History of liver, heart, or other solid organ transplant    History of spleen removal    An autoimmune disorder not listed here (specify)    A condition requiring treatment with long-term use of oral steroids (such as prednisone, prednisolone, or dexamethasone) (specify)   Have you ever been diagnosed with cancer? Select one.    No   Not selected:    Yes, I have cancer now    Yes, but I'm in remission   The flu and COVID-19 can be more serious for people in certain groups. Do any of these apply to the people who live with you? Select all that apply.    None of the above   Not selected:    Under age 5    Over age 65            Black     or     Pregnant    Has given birth, had a miscarriage, had a pregnancy loss, or had an  in the last 2 weeks   Does any member of your household have any of these medical conditions? Select all that apply.    Extreme obesity (BMI > 40)   Not selected:    Asthma    Disorders of the brain, spinal cord, or nerves and muscles, such as dementia, cerebral palsy, epilepsy, muscular dystrophy, or developmental delay    Chronic lung disease, such as COPD or cystic fibrosis    Heart disease, such as congenital heart disease, congestive heart failure, or coronary artery disease    Cerebrovascular disease, such as stroke or another condition affecting the blood vessels or blood supply to the brain    Blood disorders, such as sickle cell disease    Diabetes    Metabolic disorders such as inherited metabolic disorders or mitochondrial disease    Kidney disorders    Liver disorders    Weakened immune system due to illness or medications such as chemotherapy or steroids    Children under the age of 19 who are on long-term aspirin therapy    None of the above   Do you have any of these conditions? Scroll to see all options. Select all that apply.    None of the above   Not selected:    Blockage or narrowing of the blood vessels of  the heart    Blood dyscrasia, such anemia, leukemia, lymphoma, or myeloma    Bone marrow depression    Catecholamine-releasing paraganglioma    Congenital long QT syndrome    Depression    Difficulty urinating or completely emptying your bladder    Uncorrected electrolyte abnormalities    Fungal infection    Gastrointestinal (GI) bleeding    Gastrointestinal (GI) obstruction    Recent heart attack    High blood pressure    Irregular heartbeat or heart rhythm    Mononucleosis (mono)    Myasthenia gravis    Parkinson's disease    Pheochromocytoma    Reye syndrome    Seizure disorder    Thyroid disease    Ulcerative colitis   Have you ever had either of these conditions? Select all that apply.    No   Not selected:    Metoclopramide-associated dystonic reaction    Tardive dyskinesia   Just a few more questions about medications, and then you're finished. Have you used any non-prescription medications or nasal sprays for your current symptoms? Examples include saline sprays, decongestants, NyQuil, and Tylenol. Select one.    Yes   Not selected:    No   Which of these non-prescription medications have you tried? Scroll to see all options. Select all that apply.    Acetaminophen (Tylenol)    Cetirizine (Zyrtec)    Guaifenesin (Mucinex)    Ibuprofen (Advil, Motrin, Midol)   Not selected:    Budesonide (Rhinocort)    Chlorpheniramine (Aller-chlor, Chlor-Trimeton)    Cromolyn (NasalCrom)    Dextromethorphan (Delsym, Robitussin, Vicks DayQuil Cough)    Diphenhydramine (Benadryl)    Fexofenadine (Allegra)    Fluticasone (Flonase)    Guaifenesin/dextromethorphan (Delsym DM, Mucinex DM, Robitussin DM)    Ketotifen (Alaway, Zaditor)    Loratadine (Alavert, Claritin)    Naphazoline-pheniramine (Naphcon-A, Opcon-A, Visine-A)    Omeprazole (Prilosec)    Oxymetazoline (Afrin)    Triamcinolone (Nasacort)    None of the above   Have you taken any monoamine oxidase inhibitor (MAOI) medications in the last 14 days? Examples include  "rasagiline (Azilect), selegiline (Eldepryl, Zelapar), isocarboxazid (Marplan), phenelzine (Nardil), and tranylcypromine (Parnate). Select one.    No, not that I know of   Not selected:    Yes   Do you take Kynmobi or Apokyn (apomorphine)? Select one.    No   Not selected:    Yes   Are you still taking these medications listed in your medical record? If you're not taking any of these, click Next. Select all that apply.    cetirizine 10 MG tablet   Are you taking any other medications, vitamins, or supplements? Select one.    Yes   Not selected:    No   Have you ever had an allergic or bad reaction to any medication? Select one.    Yes   Not selected:    No   Have you had an allergic or bad reaction to any of these medications? Select all that apply.    Paxlovid, nirmatrelvir, or ritonavir (Norvir)   Not selected:    Baloxavir (Xofluza)    Benzonatate (Tessalon Perles)    Fluconazole, itraconazole, or terconazole (brands include Diflucan, Sporanox, Terazol)    Oseltamivir (Tamiflu) or zanamivir (Relenza)    No, not that I know of   Have you had an allergic or bad reaction to any of these antibiotic medications? Select all that apply.    Penicillin or any \"-cillin\" antibiotic, such as amoxicillin, ampicillin, dicloxacillin, nafcillin, or piperacillin (Brands include Augmentin, Unasyn, and Zosyn)    Cephalexin or any \"cef-\" antibiotic, such as cefazolin, cefdinir, cefuroxime, ceftriaxone, ceftazidime, or cefepime (Brands include Ancef, Ceftin, Fortaz, Keflex, Maxipime, Rocephin, and Simplicef)   Not selected:    Tetracycline or any \"-cycline\" antibiotic, such as doxycycline, demeclocycline, minocycline (Brands include Declomycin, Doryx, Dynacin, Oracea, Monodox, Panmycin, and Vibramycin)    Ciprofloxacin or any \"-floxacin\" antibiotic, such as gemifloxacin, levofloxacin, moxifloxacin, or ofloxacin (Brands include Factive, Cipro, Floxin, and Levaquin)    Azithromycin or any \"-thromycin\" antibiotic, such as erythromycin or " clarithromycin (Brands include Biaxin, Erythrocin, Z-sveta, and Zithromax)    Clindamycin or lincomycin (Brands include Cleocin and Lincocin)    No, not that I know of   Have you had an allergic or bad reaction to any of these medications? Select all that apply.    No, not that I know of   Not selected:    Albuterol or a similar medication    Atropine    Corticosteroid (steroid) medication, including topical steroids, inhaled steroids, nasal steroids, or oral steroids (budesonide, ciclesonide, dexamethasone, flunisolide, fluticasone, methylprednisolone, triamcinolone, prednisone (or brand names Alvesco, Deltasone, Flovent, Medrol, Nasacort, Rhinocort, or Veramyst)    Metoclopramide (Reglan)    Ondansetron (Zuplenz, Zofran ODT, Zofran)    Prochlorperazine (Compazine)   Have you had an allergic or bad reaction to any of these eye drops, nasal sprays, or inhalers? Scroll to see all options. Select all that apply.    No, not that I know of   Not selected:    Azelastine (Astelin, Astepro, Optivar)    Cromolyn (Crolom, NasalCrom)    Ipratropium (Atrovent)    Ketotifen (Alaway, Zaditor)    Pheniramine/naphazoline (Naphcon-A, Opcon-A, Visine-A)    Olopatadine (Pataday, Patanol, Pazeo)   Have you had an allergic or bad reaction to any of these non-prescription medications? Scroll to see all options. Select all that apply.    No, not that I know of   Not selected:    Acetaminophen (Tylenol)    Cetirizine (Zyrtec)    Dextromethorphan (Delsym, Robitussin, Vicks DayQuil Cough)    Diphenhydramine (Benadryl)    Fexofenadine (Allegra)    Guaifenesin (Mucinex)    Dextromethorphan (Delsym)    Ibuprofen (Advil, Motrin, Midol)    Loratadine (Alavert, Claritin)    Oxymetazoline (Afrin)    Pseudoephedrine (Sudafed)   Are you allergic to milk or to the proteins found in milk (for example, whey or casein)? A milk allergy is different from lactose intolerance. Select one.    No, not that I know of   Not selected:    Yes   Have you ever had  jaundice or liver problems as a result of taking azithromycin (Zithromax, Zmax)? Jaundice is a condition in which the skin and the whites of the eyes turn yellow. Select all that apply.    No, not that I know of   Not selected:    Yes, jaundice    Yes, liver problems   Do you need a doctor's note? A doctor's note confirms that you received care today and states when you can return to school or work. It does not contain information about your diagnosis or treatment plan. Your provider will make the final decision on whether to give you a doctor's note and for how long. Doctor's notes CANNOT be backdated. We can't provide medical leave paperwork through this type of visit. If more paperwork is needed to request time off, contact your primary care provider. Select one.    Today and tomorrow (2 days)   Not selected:    Today only (1 day)    3 days    5 days    7 days    No   Is there anything you'd like to add about your symptoms? Please limit your comments to the symptoms covered in this interview. If you include comments about other concerns, your provider may recommend that you be seen in person.    I just started working in a  on 6/19. 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   ----------   Medical history   The following information was received from the EMR on June 24, 2024.   Allergies:    PENICILLINS   - Allergy Type: Medication   - Reaction: Rash   - Severity: Low   - Clinical Status: Active   - Verification Status: Confirmed    CEPHALOSPORINS   - Allergy Type: Medication   - Reaction: Hives   - Severity: None   - Clinical Status: Active   - Verification Status: Confirmed   Medications:    cetirizine (zyrTEC) tablet   - Route: Oral   - Start Date: October 01, 2023   - End Date: None   - Status: Active    methylPREDNISolone (MEDROL) tablet dose pack (21 ct)   - Route:   - Start Date: April 10, 2024   - End Date: None   - Status: Active

## 2024-06-29 ENCOUNTER — E-VISIT (OUTPATIENT)
Dept: FAMILY MEDICINE CLINIC | Facility: TELEHEALTH | Age: 64
End: 2024-06-29
Payer: COMMERCIAL

## 2024-06-29 NOTE — EXTERNAL PATIENT INSTRUCTIONS
Diagnosis   Bacterial sinusitis   My name is RENÉ Leon, and I'm a healthcare provider at [facility]. I reviewed your interview, and I see that you have bacterial sinusitis, also known as a bacterial sinus infection.   About your diagnosis   The sinuses are hollow spaces connected to the nasal passages. Sinusitis occurs when the sinuses swell and block the drainage of fluid and mucus from the nose, causing pain, pressure, and congestion. Fatigue, difficulty sleeping, or decreased appetite may accompany your symptoms.   More than 90% of sinus infections are caused by viruses. However, in certain cases, a sinus infection may be caused by bacteria. Bacterial sinus infections usually look like one of the following cases:    Severe sinus symptoms with a fever over 102F.    Sinus symptoms that have not improved at all after 10 days.    Cold symptoms that slowly improve but then worsen again after 5 or 6 days, usually with a high fever, headache, or nasal discharge.   What to expect   If you follow this treatment plan, you should start to feel better within a few days.   Medications   Your pharmacy   [id number] [address] [phone]     Prescription   Doxycycline monohydrate (100mg): Take 1 tablet by mouth twice a day for 7 days for infection. This medication is an antibiotic. Take it exactly as directed. You must finish the entire course of medication, even if you feel better after the first few days of treatment.   Ibuprofen (800mg): Take 1 tablet by mouth every 8 hours as needed for up to 10 days for any fever, pain, or discomfort associated with your condition. Do not exceed 3200mg (4 tablets) in a 24-hour period.    Start taking the antibiotics I've prescribed right away. You need to finish the entire course of antibiotics, even if you start to feel better before the pills run out.    I've given you a prescription dose of ibuprofen. If it's more affordable  or convenient, you may use the equivalent amount of non-prescription ibuprofen. For example, instead of taking one 800 mg ibuprofen tablet, you may take four 200 mg ibuprofen tablets.   Non-prescription   Fluticasone (50mcg): Spray 2 times in each nostril once daily for nasal symptoms due to allergies or sinusitis. Fluticasone needs to be used every day to be effective. It may take up to a week for the full effects of the medication to be seen. Brands to look for include Flonase.    Some people develop a yeast infection after taking antibiotics. If you get a yeast infection, you can treat it with antifungal creams or suppositories. These are available without a prescription at drugstores and many supermarkets.   Other treatment    Rest! Your body needs rest to recover and fight infection.    Drink plenty of water to stay hydrated.    Use steam to soothe your sinuses: Breathe it in from a shower or a bowl of hot water. Placing a warm, moist washcloth over your nose and forehead may help relieve the sinus pain and pressure.    Try non-prescription saline nasal sprays to help your nasal symptoms. Try using a Neti Pot to flush out your stuffy nose and sinuses. Neti Pots are available at any drugstore without a prescription.    Avoid smoke and air pollution. Smoke can make infections worse.   When to seek care   Call us at [phone]   with any sudden or unexpected symptoms.    Symptoms that last longer than 10 to 14 days.    Symptoms that get better for a few days, and then suddenly get worse.    Your fever climbs over 103F or continues for more than 3 days.    Any vision changes.    A worsening headache.    Stiff neck.    Swelling of your forehead or eyes.    Coughing up red or bloody mucus.    Your throat pain becomes worse and makes swallowing extremely difficult or impossible.    More than 5 episodes of diarrhea in a day.    More than 5 episodes of vomiting in a day.    Severe shortness of breath.    Severe  chest pain   Preventing the spread of respiratory viruses   COVID-19, the flu, and other respiratory illnesses can have similar symptoms. These include fever, cough, shortness of breath, fatigue, muscle or body aches, headaches, new loss of sense of taste or smell, sore throat, stuffy or runny nose, nausea or vomiting, and diarrhea. Most people with a respiratory infection have mild symptoms and can rest at home until they get better. Elderly people and those with chronic medical problems may be at risk for more serious complications. It's important to take steps to prevent the spread of respiratory illness.   When you have symptoms of a respiratory virus:   Stay home and away from others, including people you live with who aren't sick. This is called isolation. You can stop isolation and go back to normal activities when both of these are true:    You've been feeling better overall for 24 hours    You've had no fever for 24 hours, and you're not using fever-reducing medication like Tylenol or ibuprofen   Then, for the next 5 days:    Open windows at home when possible, and use an air purifier    Consider wearing a face mask when around other people indoors    Take a COVID-19 test before being around other people indoors    Keep a physical distance from people   If you start to feel worse or get a fever again, you need to start isolation over again.   If you've had close contact with someone who has COVID, but you don't have symptoms:   You don't need to quarantine. You should take a COVID-19 test at least 5 days after the last close contact.   If you test positive for COVID but don't have symptoms:   You might be contagious. For the next 5 days:    Open windows at home when possible, and use an air purifier    Consider wearing a face mask when around other people indoors    Take a COVID-19 test before being around other people indoors    Keep a physical distance from people   Other tips to prevent the spread of  respiratory viruses    Cover your mouth and nose with a tissue when you cough or sneeze. Throw used tissues in a lined trash can right away, and wash your hands immediately after.    Wash your hands often with soap and water for at least 20 seconds. If soap and water aren't available, clean your hands with a hand  that contains at least 60% alcohol. Cover all surfaces of your hands and rub them together until they feel dry.    Avoid touching your face, especially your eyes, nose, and mouth.    Clean high-touch surfaces daily. High-touch surfaces include counters, tabletops, doorknobs, bathroom fixtures, toilets, phones, keyboards, tablets, and bedside tables. You can use soap, detergents, 60%-80% ethanol or isopropyl alcohol,  such as Windex, or bleach. All of these  are effective at killing the virus that causes COVID-19.   COVID-19 vaccine information   COVID-19 vaccines are safe, effective, and free. Everyone 6 months or older can get an updated COVID-19 vaccine. Visit www.cdc.gov/coronavirus/2019-ncov/vaccines/stay-up-to-date.html   to find out how to stay up to date with your COVID-19 vaccines. Immunocompromised people can visit www.cdc.gov/coronavirus/2019-ncov/vaccines/recommendations/immuno.html  .   Side effects such as a sore arm, tiredness, headache, and muscle pain may occur within two days of getting the vaccine and last a day or two. For the Moderna or Pfizer vaccines, side effects are more common after the second dose. People over the age of 55 are less likely to have side effects than younger people.   People who've had COVID-19 should still get vaccinated to protect themselves. It's possible to be infected by the COVID-19 virus more than once.   People who've recently had COVID-19 should wait to get vaccinated until they've recovered and completed their isolation period.   To find a COVID-19 vaccination site near you, visit www.vaccines.gov/  , call 1-904.438.1540  ,  or text your zip code to 554749 ([place]). Message and data rates may apply.   Flu vaccine information   Everyone 6 months of age and older should get a yearly flu vaccine. Children younger than 6 months old can't get the flu vaccine. This means infants are at high risk of serious complications from the flu. It's especially important for those who are around infants to get the flu vaccine.   It's best to get a flu shot by the end of October. Once you're vaccinated, it takes about two weeks for antibodies to develop and protect you against the flu. That's why it's important to get vaccinated as soon as possible.   Even if you don't get a flu shot by the end of October, you should still get one. As long as the flu viruses are still in your community, flu vaccines will remain available, even into January of the following year or later.   You need to get a flu shot every year. Flu viruses are constantly changing, so flu vaccines are usually updated from one season to the next. Your protection from the flu vaccine also lessens over time.   Common side effects of the flu shot include soreness, redness and/or swelling where the shot was given, low grade fever, and aches. Common side effects of the nasal spray flu vaccine for adults include runny nose, headaches, sore throat, and cough. For children, side effects include wheezing, vomiting, muscle aches, and fever.   The flu vaccine is safe and effective. You can't get the flu from a flu vaccine.   It's safe to get the flu vaccine at the same time as a COVID-19 vaccine.   Contact your healthcare provider today for details on when and where to get your flu vaccine.   Your provider   Your diagnosis was provided by RENÉ Leon, a member of your trusted care team at [facility].   If you have any questions, call us at [phone]  .

## 2024-06-29 NOTE — E-VISIT TREATED
Chief Complaint: Cold, flu, COVID, sinus, hay fever, or seasonal allergies   Patient introduction   Patient is 64-year-old female with cough, congestion, sore throat, voice hoarseness, fever (which may have resolved; see below), and fatigue that started 6 to 9 days ago.   COVID-19 testing history, vaccination status, and exposure:    Patient was tested for COVID-19 within the last 24 hours. Test result was negative.    Vaccinated with the updated 6434-4604 COVID-19 vaccine (Pfizer-BioNTech or Moderna vaccine after September 12, 2023; or Novavax vaccine after October 3, 2023). Received their most recent dose of the vaccine more than 14 days ago.    No known exposure to a person with a confirmed or suspected case of COVID-19.    No high-risk (household) exposure to COVID-19 within the last 14 days.   Risk factors for severe disease from COVID-19 infection    Age 50 or older.    BMI >= 40.   Warning. The following may warrant further investigation:    Severe unilateral throat pain with associated fever.   General presentation   No improvement of symptoms. Symptoms came on suddenly.   Fever:    Current fever for 1 to 3 days.    Highest temperature of 102F to 103F.   Sinus and nasal symptoms:    Yellow nasal drainage.    Nasal drainage is thick.    Postnasal drip.    1 to 3 episodes of antibiotic treatment for sinus infection in the last year.    Sinus pain or pressure on or around the upper teeth or jaw.    Patient first noticed sinus pain 5 to 9 days ago.    Sinus pain is not worse with Valsalva.    No nasal discharge.    No itchy nose or sneezing.    No history of unhealed nasal septal ulcer/nasal wound.    No history of deviated septum or nasal polyps.   Throat symptoms:    Sore throat. Unilateral pain described as severe on the right side and minimal to absent on the left side. Has redness in the back of the throat. No redness on the tonsils.    Has discomfort when swallowing but can swallow liquids and solid  "foods.    Voice is mildly hoarse. Patient does not believe hoarseness is due to voice strain.    No muffled voice.   Head and body aches:    Fatigue.    No headache.    No sweats.    No chills.    No myalgia.   Cough:    Cough is not noticeably worse depending on time of day    Cough is productive of sputum.    Describes color of sputum as green.   Wheezing and shortness of breath:    Has previously used an albuterol inhaler for URI, bronchitis, or pneumonia.    Using albuterol for current symptoms.    Using albuterol every 4 to 6 hours.    No COPD diagnosis.    No asthma diagnosis.    No wheezing.    No shortness of breath.    No previous steroid inhaler use for URIs, bronchitis, or pneumonia.   Chest pain:    No chest pain.   Ear symptoms:    Current symptoms include fullness in the ear(s).   Dizziness:    No dizziness.   Allergies:    Patient has known seasonal allergies.    Patient does not think symptoms are allergy-related.   Flu exposure:    No recent known exposure to a person with a confirmed flu diagnosis.    Received a flu vaccine more than 6 months ago.   Patient is taking over-the-counter medications for current symptoms, including cetirizine, guaifenesin, ibuprofen, and oxymetazoline.   Review of red flags/alarm symptoms:    No changes in alertness or awareness.    No paroxysmal cough followed by whoop on inspiration    No symptoms suggesting respiratory distress.    No symptoms suggesting airway obstruction.    No muffled voice.    No fever above 100.4F lasting longer than 4 days.    No decreased urination.    No blue or gray coloring present in face, lips, or nail beds.    No swelling, pain, redness, or increased warmth in the calf or lower part of ONE leg only.    No proptosis.   Risk factors for antibiotic resistance:    Highest temperature above 102F.    Close contact with a child in .   Pregnancy/menstrual status/breastfeeding:    Patient is postmenopausal.   Self-exam:    Height: 5' 4\"   "  Weight: 258 lbs    No tonsillar edema.    Tonsils appear normal.    No palatal petechiae.    Able to open mouth normally.    Swollen/painful neck lymph nodes.   Recent antibiotic use:    Has not taken antibiotics for similar symptoms within the past month.   Current medications   Currently taking cetirizine 10 MG tablet.   Medication allergies    Cephalosporins    Paxlovid or one of its components    Penicillins   Medication contraindication review   No history of anaphylactic reaction to beta-lactam antibiotics; aspirin triad; blood dyscrasia; bone marrow depression; catecholamine-releasing paraganglioma; coronary artery disease; coagulation disorder; congenital long QT syndrome; depression; electrolyte abnormalities; fungal infection; GI bleeding; GI obstruction; G6PD deficiency; heart arrhythmia; hypertension; mononucleosis; myasthenia; recent myocardial infarction; NSAID-induced asthma/urticaria; Parkinson's disease; pheochromocytoma; porphyria; Reye syndrome; seizure disorder; ulcerative colitis; and urinary retention.   No history of metoclopramide-associated dystonic reaction and tardive dyskinesia.   No known history of azithromycin-associated cholestatic jaundice or hepatic impairment.   Past medical history   Immune conditions:   No immunocompromising conditions.   No history of cancer.   Social history   Never smoked tobacco.   Patient-submitted comments   Patient was asked if they had anything to add about their symptoms. Patient writes: Have been having 1-2 coughing spells every day, but not like when I've had bronchitis, otherwise not coughing except to expel mucus. Have been using all the meds listed here in order to function and go to work, but not getting any better..   Patient did not request an excuse note.   Assessment   Bacterial sinusitis.   This is the likely diagnosis based on patient's interview responses, including:    Symptom profile    Duration of symptoms longer than 5 days    Current  high fever since initial symptoms onset    Symptoms have not improved   Plan   Medications:    doxycycline monohydrate 100 mg tablet RX 100mg 1 tab PO bid 7d for infection. This medication is an antibiotic. Take it exactly as directed. You must finish the entire course of medication, even if you feel better after the first few days of treatment. Amount is 14 tab.    ibuprofen 800 mg tablet RX 800mg 1 tab PO q8h PRN 10d for any fever, pain, or discomfort associated with your condition. Do not exceed 3200mg (4 tablets) in a 24-hour period. Amount is 30 tab.    fluticasone 50 mcg/actuation nasal spray,suspension OTC 50mcg 2 sprays each nostril nasal qd 30d for nasal symptoms due to allergies or sinusitis. Fluticasone needs to be used every day to be effective. It may take up to a week for the full effects of the medication to be seen. Brands to look for include Flonase. Amount is 16 g.   The patient's prescriptions will be sent to:   [id number] [address]   Phone: [phone]     Fax: [phone]   Patient informed to purchase OTC medication.   Education:    Condition and causes    Prevention    Treatment and self-care    When to call provider   ----------   Electronically signed by RENÉ Leon on 2024-06-29 at 15:01PM   ----------   Patient Interview Transcript:   Which of these symptoms are bothering you? Select all that apply.    Cough    Stuffed-up nose or sinuses    Sore throat    Hoarse voice or loss of voice    Fever    Fatigue or tiredness   Not selected:    Shortness of breath    Runny nose    Itchy or watery eyes    Itchy nose or sneezing    Loss of smell or taste    Headache    Sweats    Chills    Muscle or body aches    Nausea or vomiting    Diarrhea    I don't have any of these symptoms   When did your current symptoms start? Select one.    6 to 9 days ago   Not selected:    Less than 48 hours ago    3 to 5 days ago    10 to 14 days ago    2 to 3  "weeks ago    3 to 4 weeks ago    More than a month ago   Did your symptoms come on suddenly or gradually? Select one.    Suddenly   Not selected:    Gradually    I'm not sure   Have your symptoms improved at all since they began? Select one.    No   Not selected:    Yes, but they haven't gone away completely    Yes, but then they came back worse than before   Since your current symptoms started, have you been tested for COVID-19? This includes home self-tests as well as nose swab or saliva tests done at a doctor's office, lab, or testing site. Select one.    Yes   Not selected:    No   When was your most recent COVID-19 test? Select one.    Within the last 24 hours   Not selected:    Within the last week (specify date as MM/DD/YY)    7 to 14 days ago    15 to 30 days ago    More than 1 month ago   What was the result of your most recent COVID-19 test? Select one.    Negative   Not selected:    Positive   Has anyone in your household tested positive for COVID-19 in the past 14 days? Select one.    No   Not selected:    Yes   In the last 14 days, have you had close contact with someone who has COVID-19? \"Close contact\" means any of these: - Caring for someone with COVID-19. - Being within 6 feet of someone with COVID-19 for a total of at least 15 minutes over a 24-hour period. For example, three 5-minute exposures for a total of 15 minutes. - Being in direct contact with respiratory droplets from someone with COVID-19 (being coughed on, kissing, sharing utensils). Select one.    No, not that I know of   Not selected:    Yes, a confirmed case    Yes, a suspected case   Have you gotten the [id number] updated COVID-19 vaccine? This means either the updated [email or web address] or Moderna vaccine after September 12, 2023; or the updated Novavax vaccine after October 3, 2023. Select one.    Yes   Not selected:    No   When did you get the updated COVID-19 vaccine? Select one.    More than 14 days ago   Not selected:    Less than " 48 hours (2 days) ago    48 to 72 hours (3 days) ago    3 to 5 days ago    5 to 7 days ago    7 to 14 days ago   Since your symptoms started, have you felt dizzy? Select one.    No   Not selected:    Yes, but I can still do my regular daily activities    Yes, and it makes it hard to stand, walk, or do daily activities   Do you have chest pain? You might also feel it as discomfort, aching, tightness, or squeezing in the chest. Select one.    No   Not selected:    Yes   You mentioned having a fever. Do you have a fever now? Select one.    Yes, and I've had one since my symptoms started   Not selected:    Yes, but I didn't have one when my symptoms started    No, it's gone now   Did you take your temperature with a thermometer? Select one.    Yes   Not selected:    No, but it felt mild    No, but it felt high   What was the highest reading on the thermometer? Select one.    102.0 to 103.0F   Not selected:    Below 100.4F    100.4 to 101.5F    101.6 to 101.9F    Above 103.0F   How long have you had a fever? Select one.    1 to 3 days   Not selected:    Less than 24 hours    4 or more days   Do you cough so hard that it's made you gag or vomit? By gag, we mean has your coughing made you choke or dry heave? Select all that apply.    Yes, my coughing has made me gag   Not selected:    Yes, my coughing has made me vomit    No   Does your cough come in spasms of 10 to 20 coughs in a row, followed by a loud whoop when breathing in? Select one.    No   Not selected:    Yes   When is your cough the worst? Select all that apply.    I haven't noticed a difference depending on time of day   Not selected:    In the morning, or when I wake up    During the day    At nighttime, or while I'm sleeping   Are you coughing up mucus or phlegm? Select one.    Yes, a lot   Not selected:    No, my cough is dry    Yes, a little   What color is most of the mucus or phlegm that you're coughing up? Select one.    Green or greenish   Not  "selected:    Clear    White/frothy    Yellow or yellowish    Red or pink    I'm not sure   Do you feel sinus pain or pressure in any of these areas?    In my upper teeth or jaw   Not selected:    In my forehead    Around my eyes    Behind my nose    In my cheeks    No   When did you first notice your sinus pain or pressure? Select one.    5 to 9 days ago   Not selected:    Less than 5 days ago    10 to 14 days ago    2 to 4 weeks ago    1 month ago or longer   Does coughing, sneezing, or leaning forward make your sinuses feel worse? Select one.    No   Not selected:    Yes   What color is your nasal drainage? Select one.    Yellow or yellowish   Not selected:    Clear    White    Green or greenish    My nose is stuffed but not draining or running   Is your nasal drainage thick or thin? Select one.    Thick   Not selected:    Thin   Is there any drainage (mucus) going down the back of your throat? This kind of drainage is also called \"postnasal drip.\" It can cause frequent throat clearing. Select one.    Yes   Not selected:    No, not that I know of   Can you swallow liquids and solid foods? A sore throat may be painful when swallowing, but it shouldn't prevent you from swallowing. Select one.    Yes, but it's uncomfortable   Not selected:    Yes, with ease    Yes, but it's painful    It's hard to swallow anything because it feels like liquids and food get stuck in my throat    No, I can't swallow anything, liquid or solid foods   Is your throat pain worse on one side than the other? Select one.    Yes, it's worse on the right side   Not selected:    Yes, it's worse on the left side    No, it's the same on both sides   Which of these best describes your throat pain? Select one.    Severe pain on the right, and little to no pain on the left   Not selected:    Pain on both sides, but worse on the right   To recommend the best treatment, we need to see photos of your throat. You can have someone else take the photo, or " "use a mirror. If you choose not to send photos, you can still continue this interview, but your treatment options may be affected. Select one.    I'll take the photos myself   Not selected:    Someone can take the photos for me    I'd rather not send photos   Send at least 2 photos for review. - Switch the flash to On (not auto). - Stand close to a mirror. - Don't use the \"selfie\" camera. Instead, turn the phone around and tilt it slightly up. - Looking in the mirror, tap to focus on the back of the throat, not the teeth. - Say \"Ah\" so the back of your throat is visible, and take the photo. - Before sending, make sure the photos are clear and the throat is in focus.    Upload 1    Upload 2   Not selected:    Upload 3   Have you urinated at least 3 times in the last 24 hours? Select one.    Yes   Not selected:    No   Do your face, lips, or nail beds appear blue or gray? Select one.    No   Not selected:    Yes   Do you have any swelling, pain, redness, or increased warmth in the calf or lower part of ONE leg only? Select one.    No   Not selected:    Yes   Changes in alertness or awareness may mean you need emergency care. Since your symptoms started, have you had any of these? Select all that apply.    None of the above   Not selected:    Confusion    Slurred speech    Not knowing where you are or what day it is    Difficulty staying conscious    Fainting or passing out   Do your symptoms include a whistling sound, or wheezing, when you breathe? Select one.    No   Not selected:    Yes   Early in this interview, you told us you were hoarse or you'd lost your voice. How would you describe the changes to your voice? Select one.    It just sounds a little raspy   Not selected:    It's harder than usual to talk    I can barely talk at all   Is it possible that you strained your voice? Singing, yelling, or talking more or louder than usual can cause voice strain. Select one.    No, not that I know of   Not selected:   " " Yes   Since your symptoms started, have you noticed that one or both of your eyes is bulging or poking out? Select one.    No   Not selected:    Yes   Do you have any of these symptoms in your ear(s)? Select all that apply.    Fullness   Not selected:    Pain    Pressure    Crackling or popping    Plugged or blocked sensation    None of the above   Try opening your mouth as wide as you can. Are you able to open your mouth all the way as you normally would? Select one.    Yes   Not selected:    No   Does your voice sound muffled? \"Muffled\" here does not mean hoarse or scratchy. By \"muffled,\" we mean that it sounds like you're speaking with a mouth full of hot food. Select one.    No, not that I can tell   Not selected:    Yes   Are your tonsils larger than usual?    No, not that I can tell   Not selected:    Yes    I've had my tonsils removed   Is there redness in the back of your throat or on your tonsils? Select all that apply.    Yes, in the back of the throat   Not selected:    Yes, on the tonsils    No, not that I can see   Is there any white or yellow pus on your tonsils?    No, not that I can see   Not selected:    Yes   Are there red spots on the roof of your mouth or the back of your throat?    No, not that I can see   Not selected:    Yes   Are your glands/lymph nodes swollen, or does it hurt when you touch them?    Yes   Not selected:    No, not that I can tell   In the past week, has anyone around you (such as at school, work, or home) had a confirmed diagnosis of the flu? A confirmed diagnosis means that a nose swab was done to verify a flu infection. Select all that apply.    No, not that I know of   Not selected:    I live with someone who has the flu    I've been within touching distance of someone who has the flu    I've walked by, or sat about 3 feet away from, someone who has the flu    I've been in the same building as someone who has the flu   Have you ever been diagnosed with asthma? Select " one.    No   Not selected:    Yes   Have you ever been prescribed albuterol to use for wheezing, cough, or shortness of breath caused by a cold, bronchitis, or pneumonia? Albuterol (ProAir, Proventil, Ventolin) is prescribed as an inhaler or a solution to be used with a nebulizer machine. Select one.    Yes   Not selected:    No, not that I know of   Have you ever been prescribed a steroid inhaler to use for wheezing, cough, or shortness of breath caused by a cold, bronchitis, or pneumonia? Some examples of steroid inhalers include Pulmicort, Flovent, Qvar, and Alvesco. Select one.    No, not that I know of   Not selected:    Yes   Have you used albuterol for your current symptoms? This includes both albuterol inhalers and albuterol solution in a nebulizer machine. Select one.    Yes   Not selected:    No, I don't have any, or it's     No, I don't feel like I need it   How often are you using albuterol? If you're using albuterol very frequently, you'll need to be seen in person. Select one.    Every 4 to 6 hours   Not selected:    More than once an hour    Every 1 to 2 hours    Every 2 to 3 hours    Every 3 to 4 hours    Every 6 hours or longer   Do you need a refill of albuterol? Select one.    No   Not selected:    Yes   Have you ever been diagnosed with chronic obstructive pulmonary disease (COPD)? Select one.    No, not that I know of   Not selected:    Yes   In the past month, have you taken antibiotics for similar symptoms? Examples of antibiotics include amoxicillin, amoxicillin-clavulanate (Augmentin), penicillin, cefdinir (Omnicef), doxycycline, and clindamycin (Cleocin). Select one.    No   Not selected:    Yes (specify)   In the last year, how many times were you treated with antibiotics for a sinus infection? Select one.    1 to 3 times   Not selected:    None    4 or more times   Do any of these apply to you? Select all that apply.    I'm in close contact with a child in    Not selected:     I've been hospitalized within the last 5 days    I have diabetes    None of the above   Have you been diagnosed with a deviated septum or nasal polyps? The nose is divided into two nostrils by the septum. A crooked septum is called a deviated septum. Nasal polyps are growths inside the nose or sinuses. Select one.    No, not that I know of   Not selected:    Yes, but I had surgery to treat them    Yes, I have a deviated septum    Yes, I have nasal polyps    Yes, I have a deviated septum and nasal polyps   Do you have a sore inside your nose that won't heal? Select one.    No, not that I know of   Not selected:    Yes   Do you have allergies (pollen, dust mites, mold, animal dander)? Select one.    Yes   Not selected:    No, not that I know of   What kind of allergies do you have? Select all that apply.    Seasonal allergies (hay fever)   Not selected:    Perennial, or year-round, allergies (hay fever)    Pet allergies    Dust allergies    None of the above    I'm not sure   Do you think your symptoms could be allergy-related? Select one.    No   Not selected:    Yes    I'm not sure   Have you had a flu shot this season? Select one.    Yes, more than 6 months ago   Not selected:    Yes, less than 2 weeks ago    Yes, 2 to 4 weeks ago    Yes, 1 to 3 months ago    Yes, 3 to 6 months ago    No   Have you gone through menopause? Select one.    Yes   Not selected:    No    I'm going through it now   The flu and COVID-19 can be more serious for people in certain groups. The next few questions help us figure out if you or anyone you live with is at higher risk for complications from these infections. Do any of these statements apply to you? Select all that apply.    None of the above   Not selected:    I'm     I'm     I'm Black    I'm  or    Do you smoke tobacco? Select one.    No   Not selected:    Yes, every day    Yes, some days    No, I quit   Do you have a mostly inactive  lifestyle? Answer yes if all of these are true: - You spend at least 6 hours a day sitting or lying down - You get less than 2 and a half hours per week of moderate exercise such as walking fast - You get less than 1 hour and 15 minutes per week of intense exercise such as jogging or running Select one.    No   Not selected:    Yes   Do you have any of these conditions? Select all that apply.    None of the above   Not selected:    Chronic lung disease, such as cystic fibrosis or interstitial fibrosis    Heart disease, such as congenital heart disease, congestive heart failure, or coronary artery disease    Disorder of the brain, spinal cord, or nerves and muscles, such as dementia, cerebral palsy, epilepsy, muscular dystrophy, or developmental delay    Metabolic disorder or mitochondrial disease    Cerebrovascular disease, such as stroke or another condition affecting the blood vessels or blood supply to the brain    Down syndrome    Mood disorder, including depression or schizophrenia spectrum disorders    Substance use disorder, such as alcohol, opioid, or cocaine use disorder    Tuberculosis    Primary immunodeficiency   Do you live in a group care setting? Examples include: - Nursing home - Residential care - Psychiatric treatment facility - Group home - [facility] - [facility] and care home - Homeless shelter - Foster care setting Select one.    No   Not selected:    Yes   Are you a healthcare worker? Select one.    No   Not selected:    Yes   People with a very high body mass index (BMI) are at higher risk for developing complications from the flu and severe illness from COVID-19. To determine your BMI, we need to know your weight and height. Please enter your weight (in pounds).    Weight   Please enter your height.    Height   Do you have any of these conditions that can affect the immune system? Scroll to see all options. Select all that apply.    None of these   Not selected:    History of bone marrow  transplant    Chronic kidney disease    Chronic liver disease (including cirrhosis)    HIV/AIDS    Inflammatory bowel disease (Crohn's disease or ulcerative colitis)    Lupus    Moderate to severe plaque psoriasis    Multiple sclerosis    Rheumatoid arthritis    Sickle cell anemia    Alpha or beta thalassemia    History of kidney, liver, heart, or other solid organ transplant    History of liver, heart, or other solid organ transplant    History of spleen removal    An autoimmune disorder not listed here (specify)    A condition requiring treatment with long-term use of oral steroids (such as prednisone, prednisolone, or dexamethasone) (specify)   Have you ever been diagnosed with cancer? Select one.    No   Not selected:    Yes, I have cancer now    Yes, but I'm in remission   The flu and COVID-19 can be more serious for people in certain groups. Do any of these apply to the people who live with you? Select all that apply.    None of the above   Not selected:    Under age 5    Over age 65            Black     or     Pregnant    Has given birth, had a miscarriage, had a pregnancy loss, or had an  in the last 2 weeks   Does any member of your household have any of these medical conditions? Select all that apply.    None of the above   Not selected:    Asthma    Disorders of the brain, spinal cord, or nerves and muscles, such as dementia, cerebral palsy, epilepsy, muscular dystrophy, or developmental delay    Chronic lung disease, such as COPD or cystic fibrosis    Heart disease, such as congenital heart disease, congestive heart failure, or coronary artery disease    Cerebrovascular disease, such as stroke or another condition affecting the blood vessels or blood supply to the brain    Blood disorders, such as sickle cell disease    Diabetes    Metabolic disorders such as inherited metabolic disorders or mitochondrial disease    Kidney disorders    Liver disorders     Weakened immune system due to illness or medications such as chemotherapy or steroids    Children under the age of 19 who are on long-term aspirin therapy    Extreme obesity (BMI > 40)   Do you have any of these conditions? Scroll to see all options. Select all that apply.    None of the above   Not selected:    Blockage or narrowing of the blood vessels of the heart    Blood dyscrasia, such anemia, leukemia, lymphoma, or myeloma    Bone marrow depression    Catecholamine-releasing paraganglioma    Congenital long QT syndrome    Depression    Difficulty urinating or completely emptying your bladder    Uncorrected electrolyte abnormalities    Fungal infection    Gastrointestinal (GI) bleeding    Gastrointestinal (GI) obstruction    Recent heart attack    High blood pressure    Irregular heartbeat or heart rhythm    Mononucleosis (mono)    Myasthenia gravis    Parkinson's disease    Pheochromocytoma    Reye syndrome    Seizure disorder    Thyroid disease    Ulcerative colitis   Have you ever had either of these conditions? Select all that apply.    No   Not selected:    Metoclopramide-associated dystonic reaction    Tardive dyskinesia   Just a few more questions about medications, and then you're finished. Have you used any non-prescription medications or nasal sprays for your current symptoms? Examples include saline sprays, decongestants, NyQuil, and Tylenol. Select one.    Yes   Not selected:    No   Which of these non-prescription medications have you tried? Scroll to see all options. Select all that apply.    Cetirizine (Zyrtec)    Guaifenesin (Mucinex)    Ibuprofen (Advil, Motrin, Midol)    Oxymetazoline (Afrin)   Not selected:    Acetaminophen (Tylenol)    Budesonide (Rhinocort)    Chlorpheniramine (Aller-chlor, Chlor-Trimeton)    Cromolyn (NasalCrom)    Dextromethorphan (Delsym, Robitussin, Vicks DayQuil Cough)    Diphenhydramine (Benadryl)    Fexofenadine (Allegra)    Fluticasone (Flonase)    " Guaifenesin/dextromethorphan (Delsym DM, Mucinex DM, Robitussin DM)    Ketotifen (Alaway, Zaditor)    Loratadine (Alavert, Claritin)    Naphazoline-pheniramine (Naphcon-A, Opcon-A, Visine-A)    Omeprazole (Prilosec)    Triamcinolone (Nasacort)    None of the above   Have you taken any monoamine oxidase inhibitor (MAOI) medications in the last 14 days? Examples include rasagiline (Azilect), selegiline (Eldepryl, Zelapar), isocarboxazid (Marplan), phenelzine (Nardil), and tranylcypromine (Parnate). Select one.    No, not that I know of   Not selected:    Yes   Do you take Kynmobi or Apokyn (apomorphine)? Select one.    No   Not selected:    Yes   Are you still taking these medications listed in your medical record? If you're not taking any of these, click Next. Select all that apply.    cetirizine 10 MG tablet   Are you taking any other medications, vitamins, or supplements? Select one.    Yes   Not selected:    No   Have you ever had an allergic or bad reaction to any medication? Select one.    Yes   Not selected:    No   Have you had an allergic or bad reaction to any of these medications? Select all that apply.    Paxlovid, nirmatrelvir, or ritonavir (Norvir)   Not selected:    Baloxavir (Xofluza)    Benzonatate (Tessalon Perles)    Fluconazole, itraconazole, or terconazole (brands include Diflucan, Sporanox, Terazol)    Oseltamivir (Tamiflu) or zanamivir (Relenza)    No, not that I know of   Have you had an allergic or bad reaction to any of these antibiotic medications? Select all that apply.    Penicillin or any \"-cillin\" antibiotic, such as amoxicillin, ampicillin, dicloxacillin, nafcillin, or piperacillin (Brands include Augmentin, Unasyn, and Zosyn)    Cephalexin or any \"cef-\" antibiotic, such as cefazolin, cefdinir, cefuroxime, ceftriaxone, ceftazidime, or cefepime (Brands include Ancef, Ceftin, Fortaz, Keflex, Maxipime, Rocephin, and Simplicef)   Not selected:    Tetracycline or any \"-cycline\" antibiotic, " "such as doxycycline, demeclocycline, minocycline (Brands include Declomycin, Doryx, Dynacin, Oracea, Monodox, Panmycin, and Vibramycin)    Ciprofloxacin or any \"-floxacin\" antibiotic, such as gemifloxacin, levofloxacin, moxifloxacin, or ofloxacin (Brands include Factive, Cipro, Floxin, and Levaquin)    Azithromycin or any \"-thromycin\" antibiotic, such as erythromycin or clarithromycin (Brands include Biaxin, Erythrocin, Z-sveta, and Zithromax)    Clindamycin or lincomycin (Brands include Cleocin and Lincocin)    No, not that I know of   Have you had an allergic or bad reaction to any of these medications? Select all that apply.    No, not that I know of   Not selected:    Albuterol or a similar medication    Atropine    Corticosteroid (steroid) medication, including topical steroids, inhaled steroids, nasal steroids, or oral steroids (budesonide, ciclesonide, dexamethasone, flunisolide, fluticasone, methylprednisolone, triamcinolone, prednisone (or brand names Alvesco, Deltasone, Flovent, Medrol, Nasacort, Rhinocort, or Veramyst)    Metoclopramide (Reglan)    Ondansetron (Zuplenz, Zofran ODT, Zofran)    Prochlorperazine (Compazine)   Have you had an allergic or bad reaction to any of these eye drops, nasal sprays, or inhalers? Scroll to see all options. Select all that apply.    No, not that I know of   Not selected:    Azelastine (Astelin, Astepro, Optivar)    Cromolyn (Crolom, NasalCrom)    Ipratropium (Atrovent)    Ketotifen (Alaway, Zaditor)    Pheniramine/naphazoline (Naphcon-A, Opcon-A, Visine-A)    Olopatadine (Pataday, Patanol, Pazeo)   Have you had an allergic or bad reaction to any of these non-prescription medications? Scroll to see all options. Select all that apply.    No, not that I know of   Not selected:    Acetaminophen (Tylenol)    Cetirizine (Zyrtec)    Dextromethorphan (Delsym, Robitussin, Vicks DayQuil Cough)    Diphenhydramine (Benadryl)    Fexofenadine (Allegra)    Guaifenesin (Mucinex)   "  Dextromethorphan (Delsym)    Ibuprofen (Advil, Motrin, Midol)    Loratadine (Alavert, Claritin)    Oxymetazoline (Afrin)    Pseudoephedrine (Sudafed)   Are you allergic to milk or to the proteins found in milk (for example, whey or casein)? A milk allergy is different from lactose intolerance. Select one.    No, not that I know of   Not selected:    Yes   Have you ever had jaundice or liver problems as a result of taking azithromycin (Zithromax, Zmax)? Jaundice is a condition in which the skin and the whites of the eyes turn yellow. Select all that apply.    No, not that I know of   Not selected:    Yes, jaundice    Yes, liver problems   Do you need a doctor's note? A doctor's note confirms that you received care today and states when you can return to school or work. It does not contain information about your diagnosis or treatment plan. Your provider will make the final decision on whether to give you a doctor's note and for how long. Doctor's notes CANNOT be backdated. We can't provide medical leave paperwork through this type of visit. If more paperwork is needed to request time off, contact your primary care provider. Select one.    No   Not selected:    Today only (1 day)    Today and tomorrow (2 days)    3 days    5 days    7 days   Is there anything you'd like to add about your symptoms? Please limit your comments to the symptoms covered in this interview. If you include comments about other concerns, your provider may recommend that you be seen in person.    Have been having 1-2 coughing spells every day, but not like when I've had bronchitis, otherwise not coughing except to expel mucus. Have been using all the meds listed here in order to function and go to work, but not getting any better.   ----------   Medical history   Medical history data does not currently exist for this patient.

## 2024-07-24 ENCOUNTER — PATIENT ROUNDING (BHMG ONLY) (OUTPATIENT)
Dept: URGENT CARE | Facility: CLINIC | Age: 64
End: 2024-07-24
Payer: COMMERCIAL

## 2024-07-24 NOTE — ED NOTES
Thank you for letting us care for you in your recent visit to our urgent care center. We would love to hear about your experience with us. Was this the first time you have visited our location?    We’re always looking for ways to make our patients’ experiences even better. Do you have any recommendations on ways we may improve?     I appreciate you taking the time to respond. Please be on the lookout for a survey about your recent visit from [employer] via text or email. We would greatly appreciate if you could fill that out and turn it back in. We want your voice to be heard and we value your feedback.   Thank you for choosing [facility] for your healthcare needs.

## 2024-09-22 ENCOUNTER — E-VISIT (OUTPATIENT)
Dept: ADMINISTRATIVE | Facility: OTHER | Age: 64
End: 2024-09-22
Payer: COMMERCIAL

## 2024-09-22 ENCOUNTER — E-VISIT (OUTPATIENT)
Dept: FAMILY MEDICINE CLINIC | Facility: TELEHEALTH | Age: 64
End: 2024-09-22
Payer: COMMERCIAL

## 2024-09-22 DIAGNOSIS — J02.9 ACUTE PHARYNGITIS, UNSPECIFIED ETIOLOGY: Primary | ICD-10-CM

## 2024-09-22 PROCEDURE — FABRICHEALTHVISIT: Performed by: NURSE PRACTITIONER

## 2024-09-22 RX ORDER — AZITHROMYCIN 250 MG/1
TABLET, FILM COATED ORAL
Start: 2024-09-22 | End: 2024-09-22

## 2024-09-22 RX ORDER — AZITHROMYCIN 250 MG/1
TABLET, FILM COATED ORAL
Qty: 6 TABLET | Refills: 0 | Status: SHIPPED | OUTPATIENT
Start: 2024-09-22

## 2024-10-28 ENCOUNTER — PATIENT ROUNDING (BHMG ONLY) (OUTPATIENT)
Dept: URGENT CARE | Facility: CLINIC | Age: 64
End: 2024-10-28
Payer: COMMERCIAL

## 2024-12-24 ENCOUNTER — E-VISIT (OUTPATIENT)
Dept: FAMILY MEDICINE CLINIC | Facility: TELEHEALTH | Age: 64
End: 2024-12-24
Payer: COMMERCIAL

## 2024-12-24 PROCEDURE — FABRICHEALTHVISIT: Performed by: NURSE PRACTITIONER

## 2024-12-24 NOTE — E-VISIT TREATED
Date: 2024 15:02:17  Clinician: Emerald Nunez  Clinician NPI: [id number]  Patient: Betzaida Rasmussen  Patient : 1960  Patient Address: [facility] [place] [address]  Patient Phone: [phone]  Visit Protocol: URI  Patient Summary:  Betzaida is a 64 year old ( : 1960 ) female who initiated a visit for cold, sinus infection, or influenza.     Betzaida states the symptoms started suddenly 6 days ago. After the symptoms started, they improved and then got worse   again.   Symptom start date: 2024   The symptoms consist of facial pain or pressure, nasal congestion, malaise, ear pain, rhinitis, a cough, and a headache.   Symptom details     Nasal secretions: The color of the mucus is blood-tinged and yellow.    Cough: Betzaida coughs a few times an hour and the cough is more bothersome at night. Phlegm comes into the throat when coughing. Betzaida believes the cough is caused by post-nasal drip. The color of the phlegm is blood-tinged, yellow, and green.     Facial pain or pressure: The facial pain or pressure feels worse when bending over or leaning forward.     Headache: The headache is moderate (4-6 on a 10 point pain scale).      Betzaida denies having anosmia and ageusia, diarrhea, teeth pain, wheezing, chills, sore throat, myalgias, nausea, vomiting, and fever. Betzaida also denies having recent facial or sinus surgery in the past 60 days. Betzaida is not experiencing dyspnea.     Precipitating events  Betzaida has not recently been exposed to someone with influenza. Betzaida has been in close contact with the following high risk individuals: children under the age of 5.   Betzaida has received the RSV vaccine.   Betzaida has received the   influenza vaccine more than 2 weeks ago.   Pertinent COVID-19 (Coronavirus) information  Since the symptoms started, Betzaida has tested for COVID-19. The result of the test was negative.   Betzaida has had COVID-19 in the last 3 months.   Betzaida has not   received a COVID-19 vaccine in  the past year.   Pertinent medical history     Betzaida had 3 sinus infections within the past year.   Betzaida has taken an antibiotic medication for similar symptoms in the past month. Antibiotic name as reported by the patient   (free text): z-pack   A provider has not told Betzaida to avoid NSAIDs.   Betzaida does not get yeast infections when an antibiotic is taken.   Betzaida does not have diabetes. Betzaida denies having immunosuppressive conditions (e.g., chemotherapy, HIV, organ   transplant, long-term use of steroids or other immunosuppressive medications, splenectomy). Betzaida denies having severe COPD, heart disease, and congestive heart failure. Betzaida does not have asthma.   Betzaida denies having chronic lung disease, cystic   fibrosis, hypertension, long-term disabilities, mental health conditions, sickle cell disease or thalassemia, stroke or other cardiovascular disease, substance use disorders, or tuberculosis (TB).  Betzaida does not smoke or use smokeless tobacco. Betzaida   does not vape or use other e-cigarette products.   Additional information as reported by the patient (free text): Saw my ENT (Dr. Henderson) on 12/19/2024 for plugged left ear with severe pain and nasal drainage. He gave me a prescription nasacort and a   z-pack. Said to use the z-pack if I ran a fever. By the time I got home I had a temp of 101.3, started z-pack. Started to feel better until yesterday. Temp of 99.8 and more drainage, intermitent left ear pain, but not severe. Left a message for the   triage nurse this morning, but they are now closed and did not return my call.   Weight: 240 lbs (108.86 kg)    MEDICATIONS: azithromycin oral, ALLERGIES: Penicillins, cefdinir, amoxicillin, promethazine  Clinician Response:  Dear Betzaida,  Based on the information provided, you have acute bacterial sinusitis, also known as a sinus infection. Sinus infections are caused by bacteria or a virus and symptoms are almost always identical. The difference between    the 2 types of infections is timing.  Sinus infections start as viral infections and symptoms improve on their own in about 7 days. A bacterial infection may have developed if any of the following apply to you:     You have had 7 days of symptoms and are experiencing at least 2 of the following:       Fever    Facial pressure or headache    Green or yellow nasal mucus    Symptoms that improved and then got worse again       You have had symptoms for 10 or more days     Medication information  I am prescribing:       Doxycycline hyclate 100 mg oral tablet. Take 1 tablet by mouth 2 times per day for 7 days. There are no refills with this prescription.      Brompheniramine-pseudoeph-DM (Bromfed DM) 2-30-10 mg/5mL oral syrup. Take 10 milliliters by mouth every 4 hours as needed for cough. Do not take more than 60mL in 24 hours. There are no refills with this prescription.     Certain antibiotics, such as doxycycline, levofloxacin, and moxifloxacin, can cause your skin to be more sensitive to the sun. Exposure to the sun when taking these antibiotics may cause a skin rash, itching, redness in lighter skin tones,   discoloration in darker skin tones, or a severe sunburn. Be sure to avoid direct exposure to the sun, even for short periods of time, especially between 10 am and 3 pm if possible. Protective clothing from the sun and a broad-spectrum sunscreen is   recommended for all skin types when outdoors.   We recommend a water-resistant, tinted, mineral, broad-spectrum sunscreen, SPF 30 or higher, that contains iron oxide or titanium dioxide. Reapply every 2 hours. Sunscreens that also contain antioxidants   can reduce your risk of sun damage and will enhance the sunscreen's effects providing additional protection for your skin. For more information, please visit the following link:  Sunscreen education  Yeast infections can be a common side effect of   antibiotics. The most common symptom of a yeast infection is  itchiness in and around the vagina. Other signs and symptoms include burning, redness of the vulva (the outer part of the female genitals), or a thick, white vaginal discharge that looks like   cottage cheese and does not have a bad smell.  Self care  Steps you can take to be as comfortable as possible:     Rest.    Drink plenty of fluids.    Take a warm shower to loosen congestion.    Use a cool-mist humidifier.    Take a spoonful of honey to reduce your cough.     When to seek care  Please be seen in a clinic or urgent care if any of the following occur:     New symptoms develop, or symptoms become worse    Symptoms do not start to improve after 3 days of treatment     Call 911 or go to the emergency room if any of the following occur:     If you feel that your throat is closing off    Suddenly develop a rash    Unable to swallow fluids or are drooling     It is possible to have an allergic reaction to an antibiotic even if you have not had one in the past. If you notice a new rash, significant swelling, or difficulty breathing, stop taking this medication immediately and go to a clinic or urgent care.    For the latest updates on COVID-19 (Coronavirus), please visit the Centers for Disease Control and Prevention (CDC). Also, your state and local health department websites may provide additional guidance regarding testing and isolation recommendations for   your location.   Diagnosis: Acute bacterial sinusitis  Diagnosis ICD: J01.90    Follow up instructions:  ATTENTION: If you have been prescribed medications, your prescriptions will not be sent until you choose your pharmacy. To do so open the link within your notification, or go to [employer] and click eVisit in the menu to open your   treatment plan. From there, you can select your pharmacy at the bottom of your after visit summary. You can also go to [email or web address]   Prescriptions  Prescription: doxycycline hyclate 100 mg  oral tablet, take 1 tablet by mouth 2 times per day for 7 days  Sent To: [id number] - [id number] - [address] [place] [address]  Prescription: brompheniramine-pseudoeph-DM (Bromfed DM) 2-30-10 mg/5 mL oral syrup, take 10 milliliters by mouth every 4 hours as needed for cough  Sent To: [id number] - [id number] - [address] [place] [address]

## 2025-02-15 ENCOUNTER — E-VISIT (OUTPATIENT)
Dept: FAMILY MEDICINE CLINIC | Facility: TELEHEALTH | Age: 65
End: 2025-02-15
Payer: COMMERCIAL

## 2025-02-15 DIAGNOSIS — R09.81 SINUS CONGESTION: Primary | ICD-10-CM

## 2025-02-15 DIAGNOSIS — J06.9 UPPER RESPIRATORY TRACT INFECTION, UNSPECIFIED TYPE: Primary | ICD-10-CM

## 2025-02-15 RX ORDER — PREDNISONE 10 MG/1
TABLET ORAL DAILY
Start: 2025-02-15 | End: 2025-02-16

## 2025-02-15 RX ORDER — FLUTICASONE PROPIONATE 50 MCG
2 SPRAY, SUSPENSION (ML) NASAL DAILY
Start: 2025-02-15 | End: 2025-02-16

## 2025-02-15 RX ORDER — BROMPHENIRAMINE MALEATE, PSEUDOEPHEDRINE HYDROCHLORIDE, AND DEXTROMETHORPHAN HYDROBROMIDE 2; 30; 10 MG/5ML; MG/5ML; MG/5ML
5 SYRUP ORAL 4 TIMES DAILY PRN
Start: 2025-02-15 | End: 2025-02-16

## 2025-02-15 NOTE — E-VISIT TREATED
Date: 02/15/2025 12:34:20  Clinician: Kavitha Talavera  Clinician NPI: [id number]  Patient: Betzaida Rasmussen  Patient : 1960  Patient Address: [facility] [place] [address]  Patient Phone: [phone]  Visit Protocol: URI  Patient Summary:  Betzaida is a 64 year old ( : 1960 ) female who initiated a visit for cold, sinus infection, or influenza.     Betzaida states the symptoms started gradually 6 days ago.   Symptom start date: 02-   The symptoms consist of a   headache, ear pain, rhinitis, facial pain or pressure, malaise, tooth pain, nasal congestion, and a cough. Betzaida also feels feverish.   Symptom details     Nasal secretions: The color of the mucus is green.    Cough: Betzaida coughs a few times an hour and the cough is more bothersome at night. Phlegm comes into the throat when coughing. Betzaida believes the cough is caused by post-nasal drip. The color of the phlegm is green.     Temperature: Current temperature is 99.7 degrees Fahrenheit.     Facial pain or pressure: The facial pain or pressure feels worse when bending over or leaning forward.     Headache: The headache is moderate (4-6 on a 10 point pain scale).     Tooth pain: The tooth pain is not caused by a cavity, recent dental work, or other mouth problems.      Betzaida denies having wheezing, vomiting, myalgias, sore throat, anosmia and ageusia, nausea, chills, and diarrhea. Betzaida also denies having recent facial or sinus surgery in the past 60 days, taking antibiotic medication in the past month, and double   sickening (worsening symptoms after initial improvement). Betzaida is not experiencing dyspnea.   Precipitating events  Betzaida has not recently been exposed to someone with influenza. Betzaida has been in close contact with the following high risk individuals:   children under the age of 5 and adults 65 or older.   Betzaida has received the RSV vaccine.   Betzaida has received the influenza vaccine more than 2 weeks ago.   Pertinent  COVID-19 (Coronavirus) information  Since the symptoms started, Betzaida has tested for   COVID-19. The result of the test was negative. The negative result was within the last 48 hours.   Betzaida has received a COVID-19 vaccine in the past year.     Pertinent medical history     Betzaida had 3 sinus infections within the past year.   A provider   has not told Betzaida to avoid NSAIDs.   Betzaida does not get yeast infections when an antibiotic is taken.   Betzaida does not have diabetes. Betzaida denies having immunosuppressive conditions (e.g., chemotherapy, HIV, organ transplant, long-term use of steroids   or other immunosuppressive medications, splenectomy). Betzaida denies having severe COPD, congestive heart failure, and heart disease. Betzaida does not have asthma.   Betzaida denies having chronic lung disease, cystic fibrosis, hypertension, long-term   disabilities, mental health conditions, sickle cell disease or thalassemia, stroke or other cardiovascular disease, substance use disorders, or tuberculosis (TB).  Betzaida does not smoke or use smokeless tobacco. Betzaida does not vape or use other   e-cigarette products.   Additional information as reported by the patient (free text): Started on Monday 2-10-25 with clear runny nose, tickle in throat cough Wednesday evening, lost my voice Thursday morning(it works sometimes now), coughing up bright   green in the morning and evening since Thursday (no fever), coughed up bright green chunks this morning, low grade temp started today. Tested negative this morning for covid, flu A &amp; B with home test.   Weight: 235 lbs (106.59 kg)    MEDICATIONS: Daily Multi-Vitamin oral, guaifenesin oral, Zyrtec oral, ALLERGIES: promethazine, Penicillins, cefdinir, amoxicillin  Clinician Response:  Dear Betzaida,  Based on the information provided, you have a viral upper respiratory infection, otherwise known as a cold. Symptoms vary from person to person, but can include sneezing, coughing, a runny nose, sore  throat, and headache   and range from mild to severe.  Unfortunately, there are no medications that can cure a cold, so treatment is focused on controlling symptoms as much as possible. Most people gradually feel better until symptoms are gone in 1-2 weeks.  Medication   information  Because you have a viral infection, antibiotics will not help you get better. Treating a viral infection with antibiotics could actually make you feel worse.  For more information on why I am not prescribing antibiotics, please watch this   video: Antibiotics Aren't Always the Answer.  Self care  Steps you can take to be as comfortable as possible:     Rest.    Drink plenty of fluids.    Take a warm shower to loosen congestion.    Use a cool-mist humidifier.    Take a spoonful of honey to reduce your cough.     When to seek care  Please be seen in a clinic or urgent care if any of the following occur:   New symptoms develop, or symptoms become worse   Call 911 or go to the emergency room if any of the following occur:     If you feel that your throat is closing off    Suddenly develop a rash    Unable to swallow fluids or are drooling     For the latest updates on COVID-19 (Coronavirus), please visit the Centers for Disease Control and Prevention (CDC). Also, your state and local health department websites may provide additional guidance regarding testing and isolation recommendations   for your location.   Diagnosis: Viral URI  Diagnosis ICD: J06.9    Follow up instructions: ATTENTION: If you have been prescribed medications, your prescriptions will not be sent until you choose your pharmacy.  To do so open the link within your notification, or go to [employer] and click eVisit in the menu to open your   treatment plan. From there, you can select your pharmacy at the bottom of your after visit summary. You can also go to [email or web address]

## 2025-02-17 ENCOUNTER — PATIENT ROUNDING (BHMG ONLY) (OUTPATIENT)
Dept: URGENT CARE | Facility: CLINIC | Age: 65
End: 2025-02-17
Payer: COMMERCIAL

## 2025-03-13 ENCOUNTER — PATIENT ROUNDING (BHMG ONLY) (OUTPATIENT)
Dept: URGENT CARE | Facility: CLINIC | Age: 65
End: 2025-03-13
Payer: COMMERCIAL

## 2025-03-15 ENCOUNTER — TELEPHONE (OUTPATIENT)
Dept: URGENT CARE | Facility: CLINIC | Age: 65
End: 2025-03-15

## 2025-03-15 NOTE — TELEPHONE ENCOUNTER
Pt contacted the office and wants to know if she can get an extended work note due to the shingles. She stated that she is now covered. She can not even put a bra on.

## 2025-03-15 NOTE — TELEPHONE ENCOUNTER
I spoke with Radha and she stated that she will give her a note to [facility] on Tuesday and if not better she needs to follow up with her PCP.

## 2025-03-17 ENCOUNTER — PATIENT ROUNDING (BHMG ONLY) (OUTPATIENT)
Dept: URGENT CARE | Facility: CLINIC | Age: 65
End: 2025-03-17
Payer: COMMERCIAL

## 2025-06-17 ENCOUNTER — OFFICE VISIT (OUTPATIENT)
Dept: INTERNAL MEDICINE | Facility: CLINIC | Age: 65
End: 2025-06-17
Payer: COMMERCIAL

## 2025-06-17 ENCOUNTER — LAB (OUTPATIENT)
Dept: INTERNAL MEDICINE | Facility: CLINIC | Age: 65
End: 2025-06-17
Payer: COMMERCIAL

## 2025-06-17 VITALS
TEMPERATURE: 97.7 F | OXYGEN SATURATION: 98 % | WEIGHT: 242.2 LBS | DIASTOLIC BLOOD PRESSURE: 86 MMHG | HEART RATE: 60 BPM | RESPIRATION RATE: 14 BRPM | HEIGHT: 63 IN | SYSTOLIC BLOOD PRESSURE: 152 MMHG | BODY MASS INDEX: 42.91 KG/M2

## 2025-06-17 DIAGNOSIS — K62.5 RECTAL BLEEDING: ICD-10-CM

## 2025-06-17 DIAGNOSIS — M79.601 RIGHT ARM PAIN: ICD-10-CM

## 2025-06-17 DIAGNOSIS — R19.5 ABNORMAL STOOLS: ICD-10-CM

## 2025-06-17 DIAGNOSIS — Z79.1 NSAID LONG-TERM USE: ICD-10-CM

## 2025-06-17 DIAGNOSIS — M25.551 BILATERAL HIP PAIN: ICD-10-CM

## 2025-06-17 DIAGNOSIS — M25.552 BILATERAL HIP PAIN: ICD-10-CM

## 2025-06-17 DIAGNOSIS — K62.5 RECTAL BLEEDING: Primary | ICD-10-CM

## 2025-06-17 DIAGNOSIS — R10.13 EPIGASTRIC PAIN: ICD-10-CM

## 2025-06-17 PROCEDURE — 36415 COLL VENOUS BLD VENIPUNCTURE: CPT | Performed by: NURSE PRACTITIONER

## 2025-06-17 PROCEDURE — 86258 DGP ANTIBODY EACH IG CLASS: CPT | Performed by: NURSE PRACTITIONER

## 2025-06-17 PROCEDURE — 86364 TISS TRNSGLTMNASE EA IG CLAS: CPT | Performed by: NURSE PRACTITIONER

## 2025-06-17 PROCEDURE — 82784 ASSAY IGA/IGD/IGG/IGM EACH: CPT | Performed by: NURSE PRACTITIONER

## 2025-06-17 PROCEDURE — 80053 COMPREHEN METABOLIC PANEL: CPT | Performed by: NURSE PRACTITIONER

## 2025-06-17 PROCEDURE — 83690 ASSAY OF LIPASE: CPT | Performed by: NURSE PRACTITIONER

## 2025-06-17 PROCEDURE — 85027 COMPLETE CBC AUTOMATED: CPT | Performed by: NURSE PRACTITIONER

## 2025-06-17 PROCEDURE — 86140 C-REACTIVE PROTEIN: CPT | Performed by: NURSE PRACTITIONER

## 2025-06-17 RX ORDER — OMEPRAZOLE 40 MG/1
40 CAPSULE, DELAYED RELEASE ORAL DAILY
Qty: 30 CAPSULE | Refills: 1 | Status: SHIPPED | OUTPATIENT
Start: 2025-06-17

## 2025-06-17 RX ORDER — SUCRALFATE 1 G/1
1 TABLET ORAL 4 TIMES DAILY
Qty: 120 TABLET | Refills: 0 | Status: SHIPPED | OUTPATIENT
Start: 2025-06-17 | End: 2025-07-17

## 2025-06-17 RX ORDER — MULTIPLE VITAMINS W/ MINERALS TAB 9MG-400MCG
1 TAB ORAL DAILY
COMMUNITY

## 2025-06-17 NOTE — PROGRESS NOTES
Subjective   Betzaida Rasmussen is a 65 y.o. female here to establish care.  Chief Complaint   Patient presents with    Establish Care    Hip Pain     Bilateral    GI Problem     Patient states she has had a GI bleed for 3-4 weeks, was using Naproxen for about a week and 1/2  for arthritis in hips and now is experiencing rectal bleeding     Rectal Bleeding       History of Present Illness        Bilateral hip pain worse on right   Decreased ROM, difficulty putting on socks.   Has fallen 2-3 times in the last 5 years.    Hip pain not as bad when she sleeps in a different bed.   Was taking naproxen one time a t night.   Has always had bllod in stool if too ibuprofen but never with the naproxen until now.       Does not qwant to address hip pain today. Wants harriet address rectal bleeding    Had been taking a lot more NSAIDS recently due to pain. Has stopped taking now.   Thursday had 3 bloody stools, no clots. Had a lot of mucous in it.    Friday AM was normal.    Bright red at times, other times darker mauve.   No abd pain, but does have some mild epigastric tenderness        HTN- always high in clinic but normal at home. 120's/60-70's  Patient denies any headaches, dizziness, visual disturbances, palpitations, chest pain, dyspnea, TIA or CVA symptoms, leg pain/claudication symptoms, and edema.        Takes care of her elderly father in her home as well.     Recent pain in her right forearm.  She reports that she fell against the corner of the wall greater than 1 week ago.  Area is slightly swollen.  Sensation and pulses intact distally.  Has not had any imaging on this area yet.  She is interested in x-ray    The following portions of the patient's history were reviewed and updated as appropriate: allergies, current medications, past family history, past medical history, past social history, past surgical history and problem list.    Review of Systems   Constitutional:  Negative for fatigue, fever and unexpected weight loss.    Eyes:  Negative for blurred vision, double vision and visual disturbance.   Respiratory:  Negative for cough, shortness of breath and wheezing.    Cardiovascular:  Negative for chest pain, palpitations and leg swelling.   Gastrointestinal:  Positive for anal bleeding. Negative for abdominal pain, constipation, diarrhea, nausea, rectal pain, vomiting, GERD and indigestion.   Genitourinary:  Negative for difficulty urinating, frequency and urgency.   Musculoskeletal:  Positive for arthralgias. Negative for myalgias.   Skin:  Negative for color change and rash.   Neurological:  Negative for dizziness, weakness and headache.   Hematological:  Negative for adenopathy. Does not bruise/bleed easily.           Allergies   Allergen Reactions    Promethazine Shortness Of Breath     The cough medication did this to her in the middle of the evening     Ibuprofen GI Bleeding    Cephalosporins Hives    Penicillins Rash     Past Medical History:   Diagnosis Date    Allergic     seasonal    COVID-19     22, 23, 10/24/23, 11/3/24    Obesity     Pneumonia     Shingles     URI (upper respiratory infection) 2025     Past Surgical History:   Procedure Laterality Date    CARPAL TUNNEL RELEASE Right 2001    TUBAL ABDOMINAL LIGATION           Family History   Problem Relation Age of Onset    Cancer Mother      Social History     Socioeconomic History    Marital status:    Tobacco Use    Smoking status: Former     Current packs/day: 0.00     Average packs/day: 1 pack/day for 25.0 years (25.0 ttl pk-yrs)     Types: Cigarettes     Quit date: 2013     Years since quittin.4    Smokeless tobacco: Never   Vaping Use    Vaping status: Some Days    Substances: Nicotine   Substance and Sexual Activity    Alcohol use: Yes     Alcohol/week: 1.0 - 2.0 standard drink of alcohol     Types: 1 - 2 Drinks containing 0.5 oz of alcohol per week     Comment: none recently    Drug use: Never    Sexual activity: Not  "Currently     Partners: Male     Birth control/protection: Post-menopausal, None     Immunization History   Administered Date(s) Administered    Arexvy (RSV, Adults 60+ yrs) 11/29/2024    COVID-19 (PFIZER) 12YRS+ (COMIRNATY) 05/09/2024    COVID-19 (PFIZER) BIVALENT 12+YRS 12/06/2022    COVID-19 (PFIZER) Purple Cap Monovalent 02/27/2021, 03/23/2021, 10/31/2021    Covid-19 (Pfizer) Gray Cap Monovalent 04/03/2022    Flublok 18+yrs 10/19/2019    Fluzone  >6mos 11/29/2024    Fluzone (or Fluarix & Flulaval for VFC) >6mos 02/10/2018, 09/24/2018, 10/06/2022    Hep A / Hep B 04/13/2009, 05/18/2009, 10/08/2009    Influenza Injectable Mdck Pf Quad 12/11/2020, 11/12/2023    Td (TDVAX) 06/01/2009       Current Outpatient Medications:     cetirizine (zyrTEC) 10 MG tablet, Take 1 tablet by mouth Daily., Disp: , Rfl:     multivitamin with minerals tablet tablet, Take 1 tablet by mouth Daily., Disp: , Rfl:     omeprazole (priLOSEC) 40 MG capsule, Take 1 capsule by mouth Daily., Disp: 30 capsule, Rfl: 1    sucralfate (Carafate) 1 g tablet, Take 1 tablet by mouth 4 (Four) Times a Day for 30 days., Disp: 120 tablet, Rfl: 0    Objective   Blood pressure 152/86, pulse 60, temperature 97.7 °F (36.5 °C), temperature source Infrared, resp. rate 14, height 160 cm (63\"), weight 110 kg (242 lb 3.2 oz), SpO2 98%.  Physical Exam  Constitutional:       Appearance: Normal appearance. She is well-developed.   HENT:      Head: Normocephalic and atraumatic.   Eyes:      General: No scleral icterus.     Conjunctiva/sclera: Conjunctivae normal.   Cardiovascular:      Rate and Rhythm: Normal rate and regular rhythm.      Heart sounds: Normal heart sounds.   Pulmonary:      Effort: Pulmonary effort is normal. No respiratory distress.      Breath sounds: Normal breath sounds.   Abdominal:      General: Bowel sounds are normal. There is no distension.      Palpations: Abdomen is soft. There is no mass.      Tenderness: There is no abdominal tenderness. " There is no guarding or rebound.      Hernia: No hernia is present.   Musculoskeletal:         General: Normal range of motion.      Right forearm: Swelling and tenderness present. No edema, deformity or bony tenderness.        Arms:       Cervical back: Normal range of motion.      Right hip: Normal.      Left hip: Normal.      Right lower leg: No edema.      Left lower leg: No edema.   Skin:     General: Skin is warm and dry.   Neurological:      General: No focal deficit present.      Mental Status: She is alert and oriented to person, place, and time.   Psychiatric:         Attention and Perception: Attention normal.         Mood and Affect: Mood and affect normal.         Behavior: Behavior normal. Behavior is cooperative.         Thought Content: Thought content normal.         Cognition and Memory: Cognition normal.         Judgment: Judgment normal.         Assessment & Plan   Diagnoses and all orders for this visit:    1. Rectal bleeding (Primary)  -     Ambulatory Referral to Gastroenterology  -     POCT Occult blood x 3, stool; Future  -     omeprazole (priLOSEC) 40 MG capsule; Take 1 capsule by mouth Daily.  Dispense: 30 capsule; Refill: 1  -     sucralfate (Carafate) 1 g tablet; Take 1 tablet by mouth 4 (Four) Times a Day for 30 days.  Dispense: 120 tablet; Refill: 0  -     CBC (No Diff); Future  -     Comprehensive Metabolic Panel; Future  -     Clostridioides difficile Toxin - Stool, Per Rectum; Future  -     Celiac Panel Reflex To Titer; Future  -     C-reactive Protein; Future  -     Fecal Leukocytes - Stool, Per Rectum; Future  -     Lipase; Future  -     Ova & Parasite Examination - Stool, Per Rectum; Future  -     Stool Culture (Reference Lab) - Stool, Per Rectum; Future    2. NSAID long-term use  -     Ambulatory Referral to Gastroenterology  -     omeprazole (priLOSEC) 40 MG capsule; Take 1 capsule by mouth Daily.  Dispense: 30 capsule; Refill: 1  -     sucralfate (Carafate) 1 g tablet; Take 1  tablet by mouth 4 (Four) Times a Day for 30 days.  Dispense: 120 tablet; Refill: 0    3. Abnormal stools  -     Ambulatory Referral to Gastroenterology  -     CBC (No Diff); Future  -     Comprehensive Metabolic Panel; Future  -     Clostridioides difficile Toxin - Stool, Per Rectum; Future  -     Celiac Panel Reflex To Titer; Future  -     C-reactive Protein; Future  -     Fecal Leukocytes - Stool, Per Rectum; Future  -     Lipase; Future  -     Ova & Parasite Examination - Stool, Per Rectum; Future  -     Stool Culture (Reference Lab) - Stool, Per Rectum; Future    4. Epigastric pain  -     omeprazole (priLOSEC) 40 MG capsule; Take 1 capsule by mouth Daily.  Dispense: 30 capsule; Refill: 1  -     sucralfate (Carafate) 1 g tablet; Take 1 tablet by mouth 4 (Four) Times a Day for 30 days.  Dispense: 120 tablet; Refill: 0    5. Right arm pain  -     XR forearm 2 vw right; Future    6. Bilateral hip pain      She has been having some rectal bleeding that has been ongoing for 3 to 4 weeks.  Was using NSAIDs.  Has now stopped the NSAIDs.  Blood is bright red to sometimes a modest color.  She also reports a change in her stools that they are softer and looser even before she noticed the bleeding.  Refer to GI for change in stools as well as rectal bleeding.  Check occult send labs as above.  Will likely need colonoscopy.  Consider EGD, however color blood indicates this is most likely lower GI so will defer to GI for ultimate decision.  However, I will go ahead and get her on some omeprazole and sucralfate given the mild epigastric tenderness she reported (not reproducible) and the recent increase in NSAID use that has now ceased.  Will check an x-ray of the right forearm given the  Pain and swelling as well as fall.  She declines any intervention for her hips at this time.         Return in about 4 weeks (around 7/15/2025) for Recheck, and need to collect labs today.  Plan of care discussed with pt. They verbalized  understanding and agreement.     Dictated Utilizing Dragon Dictation   Please note that portions of this note were completed with a voice recognition program.   Part of this note may be an electronic transcription/translation of spoken language to printed text using the Dragon Dictation System.

## 2025-06-18 ENCOUNTER — PATIENT ROUNDING (BHMG ONLY) (OUTPATIENT)
Dept: INTERNAL MEDICINE | Facility: CLINIC | Age: 65
End: 2025-06-18
Payer: COMMERCIAL

## 2025-06-18 ENCOUNTER — TELEPHONE (OUTPATIENT)
Dept: INTERNAL MEDICINE | Facility: CLINIC | Age: 65
End: 2025-06-18

## 2025-06-18 LAB
ALBUMIN SERPL-MCNC: 3.9 G/DL (ref 3.5–5.2)
ALBUMIN/GLOB SERPL: 1.1 G/DL
ALP SERPL-CCNC: 114 U/L (ref 39–117)
ALT SERPL W P-5'-P-CCNC: 13 U/L (ref 1–33)
ANION GAP SERPL CALCULATED.3IONS-SCNC: 8.1 MMOL/L (ref 5–15)
AST SERPL-CCNC: 22 U/L (ref 1–32)
BILIRUB SERPL-MCNC: 0.2 MG/DL (ref 0–1.2)
BUN SERPL-MCNC: 13 MG/DL (ref 8–23)
BUN/CREAT SERPL: 18.3 (ref 7–25)
CALCIUM SPEC-SCNC: 10.5 MG/DL (ref 8.6–10.5)
CHLORIDE SERPL-SCNC: 106 MMOL/L (ref 98–107)
CO2 SERPL-SCNC: 26.9 MMOL/L (ref 22–29)
CREAT SERPL-MCNC: 0.71 MG/DL (ref 0.57–1)
CRP SERPL-MCNC: 0.42 MG/DL (ref 0–0.5)
DEPRECATED RDW RBC AUTO: 41.3 FL (ref 37–54)
EGFRCR SERPLBLD CKD-EPI 2021: 94.5 ML/MIN/1.73
ERYTHROCYTE [DISTWIDTH] IN BLOOD BY AUTOMATED COUNT: 11.9 % (ref 12.3–15.4)
GLOBULIN UR ELPH-MCNC: 3.5 GM/DL
GLUCOSE SERPL-MCNC: 102 MG/DL (ref 65–99)
HCT VFR BLD AUTO: 36.4 % (ref 34–46.6)
HGB BLD-MCNC: 12.4 G/DL (ref 12–15.9)
LIPASE SERPL-CCNC: 70 U/L (ref 13–60)
MCH RBC QN AUTO: 32.5 PG (ref 26.6–33)
MCHC RBC AUTO-ENTMCNC: 34.1 G/DL (ref 31.5–35.7)
MCV RBC AUTO: 95.3 FL (ref 79–97)
PLATELET # BLD AUTO: 325 10*3/MM3 (ref 140–450)
PMV BLD AUTO: 9.7 FL (ref 6–12)
POTASSIUM SERPL-SCNC: 4.6 MMOL/L (ref 3.5–5.2)
PROT SERPL-MCNC: 7.4 G/DL (ref 6–8.5)
RBC # BLD AUTO: 3.82 10*6/MM3 (ref 3.77–5.28)
SODIUM SERPL-SCNC: 141 MMOL/L (ref 136–145)
WBC NRBC COR # BLD AUTO: 9.19 10*3/MM3 (ref 3.4–10.8)

## 2025-06-18 NOTE — TELEPHONE ENCOUNTER
Caller: Betzaida Rasmussen    Relationship: Self    Best call back number: [phone]     What is the best time to reach you: ANYTIME    Who are you requesting to speak with (clinical staff, provider,  specific staff member): NURSE FOR HELEN LEMUS    What was the call regarding: PATIENT WANTS TO DISCUSS THE REFERRAL FOR THE SCOPE WHILE DOING THE COLONOSCOPY. INSURANCE MAY NOT COVER THE SCOPE.

## 2025-06-18 NOTE — PROGRESS NOTES
My name is Mar Asencio and I am a patient experience representative for [facility] on [facility].    I would like to officially welcome you to our practice.  If you do not mind I would like to ask you a few questions about your recent visit with us.  If you do not have the time to reply that is perfectly fine.      First, could you tell me what went well with your recent visit?     Secondly, we are always looking for ways to make our patients' experiences even better. Do you have any recommendations on ways we may improve?     Third, safety is a top priority therefore do you have any concerns with that while in our office?    Finally, overall were you satisfied with your first visit to our practice?     Thank you for taking the time to answer a few questions today.  In the coming days you will receive a survey.  We encourage you to complete the survey to let us know how we did!        Mra

## 2025-06-19 LAB
GLIADIN PEPTIDE IGA SER-ACNC: 4 UNITS (ref 0–19)
IGA SERPL-MCNC: 355 MG/DL (ref 87–352)
TTG IGA SER-ACNC: <2 U/ML (ref 0–3)

## 2025-06-20 ENCOUNTER — TELEPHONE (OUTPATIENT)
Dept: GASTROENTEROLOGY | Facility: CLINIC | Age: 65
End: 2025-06-20
Payer: COMMERCIAL

## 2025-06-20 ENCOUNTER — TELEPHONE (OUTPATIENT)
Dept: INTERNAL MEDICINE | Facility: CLINIC | Age: 65
End: 2025-06-20

## 2025-06-20 ENCOUNTER — LAB (OUTPATIENT)
Dept: INTERNAL MEDICINE | Facility: CLINIC | Age: 65
End: 2025-06-20
Payer: COMMERCIAL

## 2025-06-20 DIAGNOSIS — R19.5 ABNORMAL STOOLS: ICD-10-CM

## 2025-06-20 DIAGNOSIS — K62.5 RECTAL BLEEDING: ICD-10-CM

## 2025-06-20 LAB
C DIFF TOX GENS STL QL NAA+PROBE: NOT DETECTED
EXPIRATION DATE 2: ABNORMAL
EXPIRATION DATE 3: ABNORMAL
EXPIRATION DATE: ABNORMAL
GASTROCULT GAST QL: POSITIVE
HEMOCCULT SP2 STL QL: POSITIVE
HEMOCCULT SP3 STL QL: POSITIVE
Lab: ABNORMAL
WBC STL QL MICRO: ABNORMAL

## 2025-06-20 PROCEDURE — 87177 OVA AND PARASITES SMEARS: CPT | Performed by: NURSE PRACTITIONER

## 2025-06-20 PROCEDURE — 87427 SHIGA-LIKE TOXIN AG IA: CPT | Performed by: NURSE PRACTITIONER

## 2025-06-20 PROCEDURE — 87493 C DIFF AMPLIFIED PROBE: CPT | Performed by: NURSE PRACTITIONER

## 2025-06-20 PROCEDURE — 87045 FECES CULTURE AEROBIC BACT: CPT | Performed by: NURSE PRACTITIONER

## 2025-06-20 PROCEDURE — 87209 SMEAR COMPLEX STAIN: CPT | Performed by: NURSE PRACTITIONER

## 2025-06-20 PROCEDURE — 87046 STOOL CULTR AEROBIC BACT EA: CPT | Performed by: NURSE PRACTITIONER

## 2025-06-20 PROCEDURE — 87205 SMEAR GRAM STAIN: CPT | Performed by: NURSE PRACTITIONER

## 2025-06-20 PROCEDURE — 82274 ASSAY TEST FOR BLOOD FECAL: CPT | Performed by: NURSE PRACTITIONER

## 2025-06-20 NOTE — TELEPHONE ENCOUNTER
Patient returned your call.  She said her hours at work are 8:30am-5/5:30pm.  If you call and leave a message she will call back on her first break.    Phone:  [phone]

## 2025-06-20 NOTE — TELEPHONE ENCOUNTER
Patient called for an update, she stated that she will be lunch at 12:30-1. She stated that she can also receive calls after 11 am today.     Phone number: [phone]

## 2025-06-23 ENCOUNTER — OUTSIDE FACILITY SERVICE (OUTPATIENT)
Dept: GASTROENTEROLOGY | Facility: CLINIC | Age: 65
End: 2025-06-23
Payer: COMMERCIAL

## 2025-06-23 PROCEDURE — 45380 COLONOSCOPY AND BIOPSY: CPT | Performed by: INTERNAL MEDICINE

## 2025-06-23 PROCEDURE — 88305 TISSUE EXAM BY PATHOLOGIST: CPT | Performed by: INTERNAL MEDICINE

## 2025-06-23 PROCEDURE — 43239 EGD BIOPSY SINGLE/MULTIPLE: CPT | Performed by: INTERNAL MEDICINE

## 2025-06-23 PROCEDURE — 45385 COLONOSCOPY W/LESION REMOVAL: CPT | Performed by: INTERNAL MEDICINE

## 2025-06-24 ENCOUNTER — LAB REQUISITION (OUTPATIENT)
Dept: LAB | Facility: HOSPITAL | Age: 65
End: 2025-06-24
Payer: COMMERCIAL

## 2025-06-24 DIAGNOSIS — K64.8 OTHER HEMORRHOIDS: ICD-10-CM

## 2025-06-24 DIAGNOSIS — R19.7 DIARRHEA, UNSPECIFIED: ICD-10-CM

## 2025-06-24 DIAGNOSIS — K44.9 DIAPHRAGMATIC HERNIA WITHOUT OBSTRUCTION OR GANGRENE: ICD-10-CM

## 2025-06-24 DIAGNOSIS — K62.5 HEMORRHAGE OF ANUS AND RECTUM: ICD-10-CM

## 2025-06-24 DIAGNOSIS — K52.9 NONINFECTIVE GASTROENTERITIS AND COLITIS, UNSPECIFIED: ICD-10-CM

## 2025-06-24 DIAGNOSIS — D12.8 BENIGN NEOPLASM OF RECTUM: ICD-10-CM

## 2025-06-24 DIAGNOSIS — K29.70 GASTRITIS, UNSPECIFIED, WITHOUT BLEEDING: ICD-10-CM

## 2025-06-24 DIAGNOSIS — K57.30 DIVERTICULOSIS OF LARGE INTESTINE WITHOUT PERFORATION OR ABSCESS WITHOUT BLEEDING: ICD-10-CM

## 2025-06-24 DIAGNOSIS — K21.00 GASTRO-ESOPHAGEAL REFLUX DISEASE WITH ESOPHAGITIS, WITHOUT BLEEDING: ICD-10-CM

## 2025-06-24 LAB
BACTERIA SPEC CULT: NORMAL
BACTERIA SPEC CULT: NORMAL
CAMPYLOBACTER STL CULT: NORMAL
E COLI SXT STL QL IA: NEGATIVE
SALM + SHIG STL CULT: NORMAL

## 2025-06-26 ENCOUNTER — TELEPHONE (OUTPATIENT)
Dept: GASTROENTEROLOGY | Facility: CLINIC | Age: 65
End: 2025-06-26

## 2025-06-26 NOTE — TELEPHONE ENCOUNTER
,  I called patient she states she has had bleeding prior to colonoscopy. This bleeding is no different she has blood in the toilet when she has a bowel movement. I advised she should go to ED to be checked out. Patient declines at this time to go to ED because this was happening prior to colon. Just wanted to update you.

## 2025-06-26 NOTE — TELEPHONE ENCOUNTER
.    Caller: Betzaida Rasmussen    Relationship: Self    Best call back number:     [phone] (Mobile)       Who is your current provider: SERAFIN    Is your current provider offboarding? NO    Who would you like your new provider to be: ELIAN    What are your reasons for transferring care: PREFERS A WOMAN DOCTOR    Additional notes: WOULD LIKE TO BE SEEN ASAP DUE TO BLOOD IN STOOL WORSENING AFTER PROCEDURE

## 2025-06-26 NOTE — TELEPHONE ENCOUNTER
Called patient unable to reach. Lvm to advise patient she needs to go to ED if she is having active bleeding .

## 2025-06-27 ENCOUNTER — TELEPHONE (OUTPATIENT)
Dept: GASTROENTEROLOGY | Facility: CLINIC | Age: 65
End: 2025-06-27
Payer: COMMERCIAL

## 2025-06-27 LAB — REF LAB TEST METHOD: NORMAL

## 2025-06-27 NOTE — TELEPHONE ENCOUNTER
Called patient and advised.     Carmelita,  Patient is still requesting a female provider. I advised she is out on medical leave. She would like for somebody to call her about transfer.

## 2025-06-27 NOTE — TELEPHONE ENCOUNTER
----- Message from Beau Funk sent at 6/26/2025  5:57 PM EDT -----  Mrs. Rasmussen has colitis based on the colonoscopy. The pathology is pending from Monday. Awaiting the results to determine medical management.  Dr. Funk

## 2025-06-29 LAB
O+P SPEC MICRO: NORMAL
O+P STL TRI STN: NORMAL

## 2025-06-30 ENCOUNTER — TELEPHONE (OUTPATIENT)
Dept: GASTROENTEROLOGY | Facility: CLINIC | Age: 65
End: 2025-06-30
Payer: COMMERCIAL

## 2025-06-30 DIAGNOSIS — K51.811 OTHER ULCERATIVE COLITIS WITH RECTAL BLEEDING: Primary | ICD-10-CM

## 2025-06-30 NOTE — TELEPHONE ENCOUNTER
I called and spoke with MsTavia Valery on the phone this afternoon.  The biopsies from the colon demonstrated colitis from the rectum to about 45 cm.  This would be consistent with left-sided colitis.  She has experienced issues before but not to this degree.  I will go ahead and order a fecal calprotectin in addition to an IBD Prometheus.  I also stated a short course of corticosteroids will be used until a medical regimen can be started.

## 2025-07-01 ENCOUNTER — TELEPHONE (OUTPATIENT)
Dept: INTERNAL MEDICINE | Facility: CLINIC | Age: 65
End: 2025-07-01
Payer: COMMERCIAL

## 2025-07-01 ENCOUNTER — TELEPHONE (OUTPATIENT)
Dept: GASTROENTEROLOGY | Facility: CLINIC | Age: 65
End: 2025-07-01
Payer: COMMERCIAL

## 2025-07-01 NOTE — TELEPHONE ENCOUNTER
I SPOKE WITH ABDIEL. PER DR BETANCOURT OPERATIVE REPORT. HE ADVISED PATIENT TO FOLLOW ANTIREFLUX REGIMEN DAILY. CARAFATE IS TO HELP COAT THE STOMACH TO HELP WITH REFLUX SYMPTOMS AND EPIGASTRIC PAIN. PATIENT VOICED UNDERSTANDING.

## 2025-07-01 NOTE — TELEPHONE ENCOUNTER
PATIENT WANTED TO KNOW IF SHE SHOULD CONTINUE TAKING CARAFATE THAT WAS PRESCRIBE TO HER BY HER PRIMARY CARE DOCTOR.    THE PATIENT WOULD LIKE TO GET A CALL BACK IN REGARDS TO THIS AND SHE STATES THAT IT IS OK TO LVM IF SHE DOES NOT ANSWER.

## 2025-07-01 NOTE — TELEPHONE ENCOUNTER
Caller: Betzaida Rasmussen    Relationship: Self    Best call back number: [phone]     What is the best time to reach you: ANYTIME     Who are you requesting to speak with (clinical staff, provider,  specific staff member): CLINICAL STAFF    What was the call regarding: PATIENT IS CALLING TO SEE IF SHE CAN GO OFF THE CARAFATE. SHE SAYS THAT SHE DOES NOT THINK THAT IT IS BENEFITING HER NOR IS IT AGREEING WITH HER. SHE SAYS THAT SHE IS NOT SLEEPING WELL AT ALL WITH THE MEDICATION.     Is it okay if the provider responds through MyChart: YES

## 2025-07-07 ENCOUNTER — TELEPHONE (OUTPATIENT)
Age: 65
End: 2025-07-07

## 2025-07-08 ENCOUNTER — LAB (OUTPATIENT)
Dept: LAB | Facility: HOSPITAL | Age: 65
End: 2025-07-08
Payer: COMMERCIAL

## 2025-07-08 DIAGNOSIS — K51.811 OTHER ULCERATIVE COLITIS WITH RECTAL BLEEDING: ICD-10-CM

## 2025-07-09 ENCOUNTER — LAB (OUTPATIENT)
Dept: LAB | Facility: HOSPITAL | Age: 65
End: 2025-07-09
Payer: COMMERCIAL

## 2025-07-09 DIAGNOSIS — K51.811 OTHER ULCERATIVE COLITIS WITH RECTAL BLEEDING: ICD-10-CM

## 2025-07-09 PROCEDURE — 83993 ASSAY FOR CALPROTECTIN FECAL: CPT

## 2025-07-10 LAB — CALPROTECTIN STL-MCNT: 1780 UG/G (ref 0–120)

## 2025-07-15 LAB — REF LAB TEST METHOD: NORMAL

## 2025-07-21 ENCOUNTER — TELEPHONE (OUTPATIENT)
Dept: GASTROENTEROLOGY | Facility: CLINIC | Age: 65
End: 2025-07-21
Payer: COMMERCIAL

## 2025-07-21 DIAGNOSIS — K50.111 CROHN'S DISEASE OF COLON WITH RECTAL BLEEDING: Primary | ICD-10-CM

## 2025-07-21 NOTE — TELEPHONE ENCOUNTER
I spoke with Ms. Rasmussen regarding the IBD Prometheus test.  The test is consistent with inflammatory bowel disease.  It was inconclusive for Crohn disease versus ulcerative colitis.  However, there are several antibodies that are more commonly seen with Crohn's disease.  This is the likely Crohn's colitis.  I will go ahead and order a CT enterography for further evaluation.  I did discuss with Ms. Rasmussen that she is likely going to need therapy that is a more aggressive approach with top-down strategy.

## 2025-07-23 ENCOUNTER — TELEPHONE (OUTPATIENT)
Dept: GASTROENTEROLOGY | Facility: CLINIC | Age: 65
End: 2025-07-23
Payer: COMMERCIAL

## 2025-07-23 NOTE — TELEPHONE ENCOUNTER
Patient calling to make sure order for CT was sent to [facility], on [address] (She pays out of pocket and this place is cheaper, so she doesn't want it to fall through the cracks). Can you please call her and let her know that it was sent there? Please and thank you.

## 2025-07-23 NOTE — TELEPHONE ENCOUNTER
I spoke with Betzaida. Informed her that CT scan order faxed to [facility].

## 2025-08-02 ENCOUNTER — HOSPITAL ENCOUNTER (OUTPATIENT)
Dept: CT IMAGING | Facility: HOSPITAL | Age: 65
Discharge: HOME | End: 2025-08-02
Payer: COMMERCIAL

## 2025-08-02 PROCEDURE — 74176 CT ABD & PELVIS W/O CONTRAST: CPT

## 2025-08-02 RX ORDER — DIATRIZOATE MEGLUMINE AND DIATRIZOATE SODIUM 660; 100 MG/ML; MG/ML
45 SOLUTION ORAL; RECTAL ONCE
Status: COMPLETED | OUTPATIENT
Start: 2025-08-02 | End: 2025-08-02

## 2025-08-02 RX ADMIN — DIATRIZOATE MEGLUMINE AND DIATRIZOATE SODIUM 45 ML: 660; 100 SOLUTION ORAL; RECTAL at 12:10

## 2025-08-05 ENCOUNTER — OFFICE VISIT (OUTPATIENT)
Dept: GASTROENTEROLOGY | Facility: CLINIC | Age: 65
End: 2025-08-05
Payer: COMMERCIAL

## 2025-08-05 VITALS
DIASTOLIC BLOOD PRESSURE: 86 MMHG | HEART RATE: 59 BPM | BODY MASS INDEX: 41.92 KG/M2 | WEIGHT: 236.6 LBS | OXYGEN SATURATION: 97 % | SYSTOLIC BLOOD PRESSURE: 148 MMHG | TEMPERATURE: 97.1 F | HEIGHT: 63 IN

## 2025-08-05 DIAGNOSIS — K50.111 CROHN'S DISEASE OF COLON WITH RECTAL BLEEDING: Primary | ICD-10-CM

## 2025-08-05 DIAGNOSIS — K21.9 GASTROESOPHAGEAL REFLUX DISEASE, UNSPECIFIED WHETHER ESOPHAGITIS PRESENT: ICD-10-CM

## 2025-08-05 PROCEDURE — 99214 OFFICE O/P EST MOD 30 MIN: CPT | Performed by: NURSE PRACTITIONER

## 2025-08-05 RX ORDER — DICYCLOMINE HYDROCHLORIDE 10 MG/1
10 CAPSULE ORAL 3 TIMES DAILY
Qty: 270 CAPSULE | Refills: 1 | Status: SHIPPED | OUTPATIENT
Start: 2025-08-05

## 2025-08-05 RX ORDER — PREDNISONE 10 MG/1
TABLET ORAL
Qty: 70 TABLET | Refills: 0 | Status: SHIPPED | OUTPATIENT
Start: 2025-08-05 | End: 2025-09-02

## 2025-08-05 RX ORDER — PANTOPRAZOLE SODIUM 40 MG/1
40 TABLET, DELAYED RELEASE ORAL DAILY
Qty: 90 TABLET | Refills: 3 | Status: SHIPPED | OUTPATIENT
Start: 2025-08-05

## 2025-08-07 ENCOUNTER — TELEPHONE (OUTPATIENT)
Dept: INTERNAL MEDICINE | Facility: CLINIC | Age: 65
End: 2025-08-07
Payer: COMMERCIAL

## 2025-08-07 ENCOUNTER — TELEPHONE (OUTPATIENT)
Dept: GASTROENTEROLOGY | Facility: CLINIC | Age: 65
End: 2025-08-07
Payer: COMMERCIAL

## 2025-08-07 ENCOUNTER — LAB (OUTPATIENT)
Dept: LAB | Facility: HOSPITAL | Age: 65
End: 2025-08-07
Payer: COMMERCIAL

## 2025-08-07 DIAGNOSIS — K50.111 CROHN'S DISEASE OF COLON WITH RECTAL BLEEDING: ICD-10-CM

## 2025-08-07 DIAGNOSIS — K50.111 CROHN'S DISEASE OF COLON WITH RECTAL BLEEDING: Primary | ICD-10-CM

## 2025-08-07 LAB
ALBUMIN SERPL-MCNC: 3.8 G/DL (ref 3.5–5.2)
ALBUMIN/GLOB SERPL: 1.1 G/DL
ALP SERPL-CCNC: 117 U/L (ref 39–117)
ALT SERPL W P-5'-P-CCNC: 13 U/L (ref 1–33)
ANION GAP SERPL CALCULATED.3IONS-SCNC: 10.8 MMOL/L (ref 5–15)
AST SERPL-CCNC: 12 U/L (ref 1–32)
BASOPHILS # BLD AUTO: 0.04 10*3/MM3 (ref 0–0.2)
BASOPHILS NFR BLD AUTO: 0.5 % (ref 0–1.5)
BILIRUB SERPL-MCNC: 0.2 MG/DL (ref 0–1.2)
BUN SERPL-MCNC: 15 MG/DL (ref 8–23)
BUN/CREAT SERPL: 19.7 (ref 7–25)
CALCIUM SPEC-SCNC: 10.3 MG/DL (ref 8.6–10.5)
CHLORIDE SERPL-SCNC: 107 MMOL/L (ref 98–107)
CO2 SERPL-SCNC: 25.2 MMOL/L (ref 22–29)
CREAT SERPL-MCNC: 0.76 MG/DL (ref 0.57–1)
CRP SERPL-MCNC: 0.68 MG/DL (ref 0–0.5)
DEPRECATED RDW RBC AUTO: 39.7 FL (ref 37–54)
EGFRCR SERPLBLD CKD-EPI 2021: 87.1 ML/MIN/1.73
EOSINOPHIL # BLD AUTO: 0.02 10*3/MM3 (ref 0–0.4)
EOSINOPHIL NFR BLD AUTO: 0.2 % (ref 0.3–6.2)
ERYTHROCYTE [DISTWIDTH] IN BLOOD BY AUTOMATED COUNT: 11.8 % (ref 12.3–15.4)
ERYTHROCYTE [SEDIMENTATION RATE] IN BLOOD: 38 MM/HR (ref 0–30)
FERRITIN SERPL-MCNC: 14.2 NG/ML (ref 13–150)
GLOBULIN UR ELPH-MCNC: 3.6 GM/DL
GLUCOSE SERPL-MCNC: 49 MG/DL (ref 65–99)
HBV CORE IGM SERPL QL IA: NORMAL
HBV SURFACE AG SERPL QL IA: NORMAL
HCT VFR BLD AUTO: 35.1 % (ref 34–46.6)
HGB BLD-MCNC: 11.6 G/DL (ref 12–15.9)
HIV 1+2 AB+HIV1 P24 AG SERPL QL IA: NORMAL
IMM GRANULOCYTES # BLD AUTO: 0.02 10*3/MM3 (ref 0–0.05)
IMM GRANULOCYTES NFR BLD AUTO: 0.2 % (ref 0–0.5)
IRON 24H UR-MRATE: 37 MCG/DL (ref 37–145)
IRON SATN MFR SERPL: 8 % (ref 20–50)
LYMPHOCYTES # BLD AUTO: 2.96 10*3/MM3 (ref 0.7–3.1)
LYMPHOCYTES NFR BLD AUTO: 34.4 % (ref 19.6–45.3)
MCH RBC QN AUTO: 30.8 PG (ref 26.6–33)
MCHC RBC AUTO-ENTMCNC: 33 G/DL (ref 31.5–35.7)
MCV RBC AUTO: 93.1 FL (ref 79–97)
MONOCYTES # BLD AUTO: 0.85 10*3/MM3 (ref 0.1–0.9)
MONOCYTES NFR BLD AUTO: 9.9 % (ref 5–12)
NEUTROPHILS NFR BLD AUTO: 4.72 10*3/MM3 (ref 1.7–7)
NEUTROPHILS NFR BLD AUTO: 54.8 % (ref 42.7–76)
NRBC BLD AUTO-RTO: 0 /100 WBC (ref 0–0.2)
PLATELET # BLD AUTO: 358 10*3/MM3 (ref 140–450)
PMV BLD AUTO: 10 FL (ref 6–12)
POTASSIUM SERPL-SCNC: 3.6 MMOL/L (ref 3.5–5.2)
PROT SERPL-MCNC: 7.4 G/DL (ref 6–8.5)
RBC # BLD AUTO: 3.77 10*6/MM3 (ref 3.77–5.28)
SODIUM SERPL-SCNC: 143 MMOL/L (ref 136–145)
TIBC SERPL-MCNC: 451 MCG/DL (ref 298–536)
TRANSFERRIN SERPL-MCNC: 303 MG/DL (ref 200–360)
VIT B12 BLD-MCNC: 692 PG/ML (ref 211–946)
WBC NRBC COR # BLD AUTO: 8.61 10*3/MM3 (ref 3.4–10.8)

## 2025-08-07 PROCEDURE — 85652 RBC SED RATE AUTOMATED: CPT

## 2025-08-07 PROCEDURE — 86708 HEPATITIS A ANTIBODY: CPT

## 2025-08-07 PROCEDURE — 82607 VITAMIN B-12: CPT | Performed by: NURSE PRACTITIONER

## 2025-08-07 PROCEDURE — 82728 ASSAY OF FERRITIN: CPT

## 2025-08-07 PROCEDURE — 86140 C-REACTIVE PROTEIN: CPT

## 2025-08-07 PROCEDURE — 86705 HEP B CORE ANTIBODY IGM: CPT

## 2025-08-07 PROCEDURE — 85025 COMPLETE CBC W/AUTO DIFF WBC: CPT

## 2025-08-07 PROCEDURE — 84466 ASSAY OF TRANSFERRIN: CPT

## 2025-08-07 PROCEDURE — 87340 HEPATITIS B SURFACE AG IA: CPT

## 2025-08-07 PROCEDURE — 80053 COMPREHEN METABOLIC PANEL: CPT

## 2025-08-07 PROCEDURE — 86480 TB TEST CELL IMMUN MEASURE: CPT

## 2025-08-07 PROCEDURE — 87385 HISTOPLASMA CAPSUL AG IA: CPT

## 2025-08-07 PROCEDURE — 83540 ASSAY OF IRON: CPT

## 2025-08-07 PROCEDURE — 36415 COLL VENOUS BLD VENIPUNCTURE: CPT

## 2025-08-07 PROCEDURE — G0432 EIA HIV-1/HIV-2 SCREEN: HCPCS

## 2025-08-08 LAB — HAV AB SER QL IA: POSITIVE

## 2025-08-10 LAB
GAMMA INTERFERON BACKGROUND BLD IA-ACNC: 0.05 IU/ML
M TB IFN-G BLD-IMP: NEGATIVE
M TB IFN-G CD4+ BCKGRND COR BLD-ACNC: 0.06 IU/ML
M TB IFN-G CD4+CD8+ BCKGRND COR BLD-ACNC: 0.1 IU/ML
MITOGEN IGNF BCKGRD COR BLD-ACNC: >10 IU/ML
SERVICE CMNT-IMP: NORMAL

## 2025-08-11 ENCOUNTER — TELEPHONE (OUTPATIENT)
Dept: GASTROENTEROLOGY | Facility: CLINIC | Age: 65
End: 2025-08-11
Payer: COMMERCIAL

## 2025-08-11 ENCOUNTER — RESULTS FOLLOW-UP (OUTPATIENT)
Dept: LAB | Facility: HOSPITAL | Age: 65
End: 2025-08-11
Payer: COMMERCIAL

## 2025-08-12 DIAGNOSIS — K50.111 CROHN'S DISEASE OF COLON WITH RECTAL BLEEDING: Primary | ICD-10-CM

## 2025-08-12 DIAGNOSIS — K50.10 CROHN'S DISEASE OF LARGE INTESTINE WITHOUT COMPLICATION: Primary | ICD-10-CM

## 2025-08-12 PROBLEM — D50.0 IRON DEFICIENCY ANEMIA DUE TO CHRONIC BLOOD LOSS: Status: ACTIVE | Noted: 2025-08-12

## 2025-08-12 RX ORDER — DIPHENHYDRAMINE HYDROCHLORIDE 50 MG/ML
50 INJECTION, SOLUTION INTRAMUSCULAR; INTRAVENOUS ONCE
OUTPATIENT
Start: 2025-08-13 | End: 2025-08-13

## 2025-08-12 RX ORDER — SODIUM CHLORIDE 9 MG/ML
20 INJECTION, SOLUTION INTRAVENOUS ONCE
OUTPATIENT
Start: 2025-08-12

## 2025-08-12 RX ORDER — DIPHENHYDRAMINE HCL 25 MG
50 CAPSULE ORAL ONCE
OUTPATIENT
Start: 2025-08-13 | End: 2025-08-13

## 2025-08-12 RX ORDER — FAMOTIDINE 10 MG/ML
20 INJECTION, SOLUTION INTRAVENOUS AS NEEDED
OUTPATIENT
Start: 2025-08-12

## 2025-08-12 RX ORDER — SODIUM CHLORIDE 9 MG/ML
20 INJECTION, SOLUTION INTRAVENOUS ONCE
OUTPATIENT
Start: 2025-08-13

## 2025-08-12 RX ORDER — DIPHENHYDRAMINE HYDROCHLORIDE 50 MG/ML
50 INJECTION, SOLUTION INTRAMUSCULAR; INTRAVENOUS AS NEEDED
OUTPATIENT
Start: 2025-08-12

## 2025-08-12 RX ORDER — HYDROCORTISONE SODIUM SUCCINATE 100 MG/2ML
100 INJECTION INTRAMUSCULAR; INTRAVENOUS AS NEEDED
OUTPATIENT
Start: 2025-08-12

## 2025-08-13 DIAGNOSIS — K50.111 CROHN'S DISEASE OF COLON WITH RECTAL BLEEDING: ICD-10-CM

## 2025-08-13 LAB — H CAPSUL AG UR QL IA: NEGATIVE

## 2025-08-14 ENCOUNTER — TELEPHONE (OUTPATIENT)
Dept: GASTROENTEROLOGY | Facility: CLINIC | Age: 65
End: 2025-08-14
Payer: COMMERCIAL

## 2025-08-18 ENCOUNTER — TELEPHONE (OUTPATIENT)
Dept: GASTROENTEROLOGY | Facility: CLINIC | Age: 65
End: 2025-08-18
Payer: COMMERCIAL

## 2025-08-27 ENCOUNTER — HOSPITAL ENCOUNTER (OUTPATIENT)
Dept: ONCOLOGY | Facility: HOSPITAL | Age: 65
Discharge: HOME | End: 2025-08-27
Admitting: NURSE PRACTITIONER
Payer: COMMERCIAL

## 2025-08-27 VITALS
SYSTOLIC BLOOD PRESSURE: 169 MMHG | HEART RATE: 80 BPM | DIASTOLIC BLOOD PRESSURE: 74 MMHG | RESPIRATION RATE: 16 BRPM | HEIGHT: 63 IN | WEIGHT: 238 LBS | TEMPERATURE: 97.8 F | BODY MASS INDEX: 42.17 KG/M2

## 2025-08-27 DIAGNOSIS — K50.10 CROHN'S DISEASE OF LARGE INTESTINE WITHOUT COMPLICATION: Primary | ICD-10-CM

## 2025-08-27 LAB
ALBUMIN SERPL-MCNC: 4 G/DL (ref 3.5–5.2)
ALBUMIN/GLOB SERPL: 1.3 G/DL
ALP SERPL-CCNC: 95 U/L (ref 39–117)
ALT SERPL W P-5'-P-CCNC: 22 U/L (ref 1–33)
ANION GAP SERPL CALCULATED.3IONS-SCNC: 11.2 MMOL/L (ref 5–15)
AST SERPL-CCNC: 24 U/L (ref 1–32)
BILIRUB SERPL-MCNC: 0.3 MG/DL (ref 0–1.2)
BUN SERPL-MCNC: 14.2 MG/DL (ref 8–23)
BUN/CREAT SERPL: 23.7 (ref 7–25)
CALCIUM SPEC-SCNC: 9.9 MG/DL (ref 8.6–10.5)
CHLORIDE SERPL-SCNC: 103 MMOL/L (ref 98–107)
CO2 SERPL-SCNC: 23.8 MMOL/L (ref 22–29)
CREAT SERPL-MCNC: 0.6 MG/DL (ref 0.57–1)
EGFRCR SERPLBLD CKD-EPI 2021: 99.8 ML/MIN/1.73
GLOBULIN UR ELPH-MCNC: 3 GM/DL
GLUCOSE SERPL-MCNC: 109 MG/DL (ref 65–99)
POTASSIUM SERPL-SCNC: 4.2 MMOL/L (ref 3.5–5.2)
PROT SERPL-MCNC: 7 G/DL (ref 6–8.5)
SODIUM SERPL-SCNC: 138 MMOL/L (ref 136–145)

## 2025-08-27 PROCEDURE — 96365 THER/PROPH/DIAG IV INF INIT: CPT

## 2025-08-27 PROCEDURE — 25010000002 RISANKIZUMAB-RZAA 600 MG/10ML SOLUTION 10 ML VIAL: Performed by: NURSE PRACTITIONER

## 2025-08-27 PROCEDURE — 80053 COMPREHEN METABOLIC PANEL: CPT | Performed by: NURSE PRACTITIONER

## 2025-08-27 PROCEDURE — 25810000003 SODIUM CHLORIDE 0.9 % SOLUTION 250 ML FLEX CONT: Performed by: NURSE PRACTITIONER

## 2025-08-27 PROCEDURE — 25810000003 SODIUM CHLORIDE 0.9 % SOLUTION: Performed by: NURSE PRACTITIONER

## 2025-08-27 RX ORDER — SODIUM CHLORIDE 9 MG/ML
20 INJECTION, SOLUTION INTRAVENOUS ONCE
Status: COMPLETED | OUTPATIENT
Start: 2025-08-27 | End: 2025-08-27

## 2025-08-27 RX ORDER — SODIUM CHLORIDE 9 MG/ML
20 INJECTION, SOLUTION INTRAVENOUS ONCE
OUTPATIENT
Start: 2025-09-24

## 2025-08-27 RX ORDER — DIPHENHYDRAMINE HYDROCHLORIDE 50 MG/ML
50 INJECTION, SOLUTION INTRAMUSCULAR; INTRAVENOUS ONCE
OUTPATIENT
Start: 2025-09-24 | End: 2025-09-24

## 2025-08-27 RX ORDER — DIPHENHYDRAMINE HCL 25 MG
50 CAPSULE ORAL ONCE
OUTPATIENT
Start: 2025-09-24 | End: 2025-09-24

## 2025-08-27 RX ADMIN — SODIUM CHLORIDE 20 ML/HR: 9 INJECTION, SOLUTION INTRAVENOUS at 14:01

## 2025-08-27 RX ADMIN — SODIUM CHLORIDE 600 MG: 9 INJECTION, SOLUTION INTRAVENOUS at 14:01
